# Patient Record
Sex: MALE | Race: WHITE | NOT HISPANIC OR LATINO | Employment: FULL TIME | URBAN - METROPOLITAN AREA
[De-identification: names, ages, dates, MRNs, and addresses within clinical notes are randomized per-mention and may not be internally consistent; named-entity substitution may affect disease eponyms.]

---

## 2021-08-10 ENCOUNTER — TRANSFERRED RECORDS (OUTPATIENT)
Dept: HEALTH INFORMATION MANAGEMENT | Facility: CLINIC | Age: 56
End: 2021-08-10

## 2021-10-19 ENCOUNTER — TRANSFERRED RECORDS (OUTPATIENT)
Dept: HEALTH INFORMATION MANAGEMENT | Facility: CLINIC | Age: 56
End: 2021-10-19

## 2021-10-22 ENCOUNTER — TRANSCRIBE ORDERS (OUTPATIENT)
Dept: OTHER | Age: 56
End: 2021-10-22

## 2021-10-22 ENCOUNTER — MEDICAL CORRESPONDENCE (OUTPATIENT)
Dept: HEALTH INFORMATION MANAGEMENT | Facility: CLINIC | Age: 56
End: 2021-10-22

## 2021-10-22 DIAGNOSIS — G62.9 PERIPHERAL NEUROPATHY: Primary | ICD-10-CM

## 2021-10-22 DIAGNOSIS — R52 DIFFUSE PAIN: ICD-10-CM

## 2021-10-22 DIAGNOSIS — R20.0 NUMBNESS AND TINGLING: ICD-10-CM

## 2021-10-22 DIAGNOSIS — R20.2 NUMBNESS AND TINGLING: ICD-10-CM

## 2021-10-25 NOTE — TELEPHONE ENCOUNTER
RECORDS RECEIVED FROM: External   REASON FOR VISIT: Peripheral neuropathy, Numbness and tingling, Diffuse pain   Date of Appt: 2/2/22   NOTES (FOR ALL VISITS) STATUS DETAILS   OFFICE NOTE from referring provider Received Dr Josselyn Solis @ [facility]:  8/10/21   OFFICE NOTE from other specialist N/A    DISCHARGE SUMMARY from hospital N/A    DISCHARGE REPORT from the ER N/A    OPERATIVE REPORT N/A    MEDICATION LIST Care Everywhere    IMAGING  (FOR ALL VISITS)     EMG Received [facility]:  10/19/21   EEG N/A    LUMBAR PUNCTURE N/A    KB SCAN N/A    ULTRASOUND (CAROTID BILAT) *VASCULAR* N/A    MRI (HEAD, NECK, SPINE) N/A    CT (HEAD, NECK, SPINE) N/A       Action 10/25/21 MV 8.15am   Action Taken Records request faxed to [facility]    --11/2/21 MV 2.11pm--  Recs received and scanned in chart

## 2021-10-27 ENCOUNTER — TELEPHONE (OUTPATIENT)
Dept: NEUROLOGY | Facility: CLINIC | Age: 56
End: 2021-10-27

## 2021-10-27 NOTE — TELEPHONE ENCOUNTER
[facility] Call Center    Phone Message    May a detailed message be left on voicemail: no     Reason for Call: Other: Elinor calling to request that Dr. Fernandez from [facility] receive a call to discuss if Sharath's appointment could be moved up. Please call Dr. Fernandez at [phone] to discuss.     Action Taken: Message routed to:  [facility]    Travel Screening: Not Applicable

## 2021-10-27 NOTE — TELEPHONE ENCOUNTER
Please help pt schedule new pt visit with general neurology. May be able to see sooner than with neuromuscular.     Thanks    Angelique ALBRIGHT

## 2021-11-02 NOTE — TELEPHONE ENCOUNTER
Patient is returning Angelique's call in regards to an appointment that is being offered to him for tomorrow at 8am either with Dr. Chavez or a neuromuscular provider. There is an opening with Dr. Chavez tomorrow and writer offered appointment to patient.     Please verify if appointment is scheduled appropriately.

## 2021-11-03 ENCOUNTER — OFFICE VISIT (OUTPATIENT)
Dept: NEUROLOGY | Facility: CLINIC | Age: 56
End: 2021-11-03
Payer: COMMERCIAL

## 2021-11-03 ENCOUNTER — LAB (OUTPATIENT)
Dept: LAB | Facility: CLINIC | Age: 56
End: 2021-11-03
Payer: COMMERCIAL

## 2021-11-03 VITALS — OXYGEN SATURATION: 97 % | SYSTOLIC BLOOD PRESSURE: 90 MMHG | HEART RATE: 77 BPM | DIASTOLIC BLOOD PRESSURE: 60 MMHG

## 2021-11-03 DIAGNOSIS — G90.1 DYSAUTONOMIA (H): Primary | ICD-10-CM

## 2021-11-03 DIAGNOSIS — G62.9 SENSORY NEUROPATHY: ICD-10-CM

## 2021-11-03 DIAGNOSIS — G90.1 DYSAUTONOMIA (H): ICD-10-CM

## 2021-11-03 LAB
CK SERPL-CCNC: 90 U/L (ref 30–300)
TTG IGA SER-ACNC: 0.9 U/ML
TTG IGG SER-ACNC: 0.9 U/ML

## 2021-11-03 PROCEDURE — 99205 OFFICE O/P NEW HI 60 MIN: CPT | Performed by: PSYCHIATRY & NEUROLOGY

## 2021-11-03 PROCEDURE — 84999 UNLISTED CHEMISTRY PROCEDURE: CPT | Performed by: PSYCHIATRY & NEUROLOGY

## 2021-11-03 PROCEDURE — 82525 ASSAY OF COPPER: CPT | Mod: 90 | Performed by: PATHOLOGY

## 2021-11-03 PROCEDURE — 86255 FLUORESCENT ANTIBODY SCREEN: CPT | Mod: 90 | Performed by: PATHOLOGY

## 2021-11-03 PROCEDURE — 83520 IMMUNOASSAY QUANT NOS NONAB: CPT | Performed by: PSYCHIATRY & NEUROLOGY

## 2021-11-03 PROCEDURE — 83516 IMMUNOASSAY NONANTIBODY: CPT | Performed by: PSYCHIATRY & NEUROLOGY

## 2021-11-03 PROCEDURE — 99000 SPECIMEN HANDLING OFFICE-LAB: CPT | Performed by: PATHOLOGY

## 2021-11-03 PROCEDURE — 36415 COLL VENOUS BLD VENIPUNCTURE: CPT | Performed by: PATHOLOGY

## 2021-11-03 PROCEDURE — 86334 IMMUNOFIX E-PHORESIS SERUM: CPT | Mod: 26 | Performed by: PATHOLOGY

## 2021-11-03 PROCEDURE — 86334 IMMUNOFIX E-PHORESIS SERUM: CPT | Mod: TC | Performed by: PSYCHIATRY & NEUROLOGY

## 2021-11-03 PROCEDURE — 83519 RIA NONANTIBODY: CPT | Mod: 90 | Performed by: PATHOLOGY

## 2021-11-03 PROCEDURE — 82550 ASSAY OF CK (CPK): CPT | Performed by: PATHOLOGY

## 2021-11-03 RX ORDER — GABAPENTIN 300 MG/1
600 CAPSULE ORAL 3 TIMES DAILY
COMMUNITY
Start: 2021-06-10 | End: 2021-11-22

## 2021-11-03 RX ORDER — PREGABALIN 75 MG/1
75 CAPSULE ORAL 2 TIMES DAILY
COMMUNITY
Start: 2021-07-02 | End: 2021-11-22

## 2021-11-03 RX ORDER — OMEPRAZOLE 40 MG/1
40 CAPSULE, DELAYED RELEASE ORAL DAILY
COMMUNITY
Start: 2021-06-01

## 2021-11-03 RX ORDER — CYCLOBENZAPRINE HCL 5 MG
5 TABLET ORAL 3 TIMES DAILY PRN
COMMUNITY
Start: 2021-06-29 | End: 2021-12-02

## 2021-11-03 RX ORDER — DULOXETIN HYDROCHLORIDE 60 MG/1
60 CAPSULE, DELAYED RELEASE ORAL DAILY
COMMUNITY
Start: 2021-04-30 | End: 2021-12-02

## 2021-11-03 RX ORDER — TRAZODONE HYDROCHLORIDE 50 MG/1
50-100 TABLET, FILM COATED ORAL DAILY
COMMUNITY
Start: 2021-06-29

## 2021-11-03 RX ORDER — ATORVASTATIN CALCIUM 40 MG/1
1 TABLET, FILM COATED ORAL AT BEDTIME
COMMUNITY
Start: 2021-06-01

## 2021-11-03 RX ORDER — DRONABINOL 5 MG/1
5 CAPSULE ORAL 2 TIMES DAILY
COMMUNITY
Start: 2021-05-10

## 2021-11-03 ASSESSMENT — PAIN SCALES - GENERAL: PAINLEVEL: MODERATE PAIN (4)

## 2021-11-03 NOTE — Clinical Note
Please follow up on cardiology consult as I do not see an appointment or telephone message from them. Thanks.

## 2021-11-03 NOTE — LETTER
"11/3/2021       RE: Sharath Jaimes  [address] [facility] [address]     Dear Colleague,    Thank you for referring your patient, Sharath Jaimes, to the [facility] [place] [facility]. Please see a copy of my visit note below.    Service Date: 2021    MD Perez Paulino MD        RE:       Sharath Jaimes    MRN:   [id number]    :    1965        Dear Doctors:    I saw Sharath Jaimes in neuromuscular consultation today at the [facility].  Mr. Jaimes is a 56-year-old man who sought medical attention 8 months ago because of considerable fatigue.  His blood sugar was 650 and he was hospitalized for management of newly diagnosed diabetes.  His hemoglobin A1c dropped with treatment from greater than 16.9 on 2021 to 9.6 on 2021.  During that time, and shortly after treatment began, he experienced episodic stabbing pains involving principally the torso and lower extremities.  These have improved somewhat with Lyrica 75 mg twice a day, and perhaps with time, although they do persist.  He also reports feeling lightheaded, principally when standing although not necessarily upon standing.  He has had 1 syncopal event and none recently.  He also reports pressure allodynia in the feet.  He continues to feel \"wobbly\" and says that walking slowly and \"wobbly is the new me.\" Recent electrodiagnostic studies demonstrated evidence of a polyneuropathy with conduction velocities in the demyelinating range and he was referred for further evaluation of this.    Mr. Jaimes denies cognitive or behavioral change, diplopia, ptosis or dysphagia.  He lost a considerable amount of weight prior to his current presentation, but this has now stabilized.  He does endorse some nausea and changes in bowel habits without color changes.  He endorses impotence, which developed prior to the onset of these " symptoms.  He denies changes in sweating.  He denies heat intolerance.  He feels generally weak, but does not have specific weakness, and is still working.  That said, he has had to call in sick fairly frequently because of feeling unwell.  He has no past medical history.  He smokes.  He drinks alcohol infrequently.  He has 4 children.  His mother had diabetes.  There is no family history of neuromuscular disease.    PHYSICAL EXAMINATION:  The patient is a thin gentleman.  Weight was not recorded today, however.  Blood pressure was 90/60 with a pulse of 77 and oxygen saturation of 97%.  Orthostatic vital signs were requested, but are not identifiable in the medical record at the time of this dictation; however, I was informed that there was not a substantial drop in blood pressure with standing, but that his pulse did increase by 20 points.    NEUROLOGIC EXAMINATION:  The patient was alert.  Speech, language and affect were appropriate.  He was fully oriented.  Formal testing of attention was normal.  He recalled 2/3 words after distraction, 3/3 with cues.  Pupils are equal and round and unequivocally and briskly reactive to light.  Extraocular movements were full without nystagmus or diplopia.  There was no ptosis.  Facial strength is normal.  Bulbar examination is normal.  Perception of light touch is preserved.  Vibration perception is normal at the malleoli and absent at the great toes.  Perception of pinprick is variable and does not follow a length-dependent pattern.  Motor examination demonstrates normal tone, bulk and strength.  Rapid repetitive movements are within normal limits.  Coordination is normal.  Reflexes are absent at the jaw, 1+ at bilateral brachioradialis, 0 and 2+ at right and left biceps, 2+ at bilateral triceps, 2+ at bilateral patellae, and absent at the ankles.  Plantar responses are mute.  He walks independently with a normal base and posture.  Romberg sign is not present.    MEDICAL  RECORD REVIEW:  Electrodiagnostic studies are as noted.  Normal or negative laboratory studies include vitamin B12 level, HIV titer, syphilis serology, TSH, and basic metabolic panel except for elevated glucose and mildly elevated BUN and creatinine ratio as of June.    Mr. Jaimes has widespread painful paresthesias that may reflect treatment induced neuropathy of diabetes; however, electrodiagnostic studies demonstrate findings fulfilling EFNS/PNS criteria for CIDP.  His clinical presentation; however, is not characteristic of CIDP, in as much as strength is preserved, large fiber sensation is relatively preserved, and reflexes are relatively preserved.  In addition, he has signs suggestive of autonomic dysfunction, although apparently diane an appropriate tachycardia upon standing.    I have extended his laboratory studies considerably to investigate the aforementioned.  I will arrange genetic testing for an inherited neuropathy panel.  We will check his CMAPs after 10 seconds of maximal voluntary contraction, as generalized fatigue and autonomic dysfunction, in addition combined with low amplitudes on initial testing, can occur in Lambert-Eaton myasthenic syndrome, although this of course would not explain his conduction velocities.  His inherited neuropathy panel will include testing for TTR mutations in consideration of the autonomic dysfunction, painful neuropathy, and abnormal conduction velocities.  CSF examination may be necessary as well, depending upon results of the aforementioned.    With regard to management, I increased his Lyrica modestly and we can increase it further if necessary.  He was prescribed duloxetine at one time, but did not take it and does not recall why.  I discussed first line management of orthostatic hypotension, although to be clear he may have more of a postural orthostatic tachycardia then true orthostatic hypotension.  Nonetheless, the relatively low baseline blood pressure is  notable.  He may benefit from the addition of Florinef or midodrine, and I am referring him to our Cardiology service for further evaluation and recommendations; they will likely do cardiovagal and tilt table testing.  Because of a prior abnormal upper endoscopy and likely gastroparesis, I have referred him for gastroenterology evaluation as well.  His condition is clearly active and I will make arrangements to see him back in the relatively near future.    Sincerely,     Rocky Chavez MD      60 minutes spent on the date of the encounter on chart review, history and examination, documentation and further activities as noted above.

## 2021-11-03 NOTE — PATIENT INSTRUCTIONS
1. Blood tests today.  2. Referral to gastroenterology for help with symptoms of gastroparesis and followup of endoscopy findings.  3. Referral to cardiology for help with low blood pressure as a symptom of autonomic dysfunction. They may start medication.  4. Genetic testing. You will get a call to arrange.  5. Increase Lyrica to 100 mg twice daily.  6. Follow up with me in several weeks. We may do it in the EMG lab as I would like to check one brief test of the nerves.

## 2021-11-04 LAB — PROT PATTERN SERPL IFE-IMP: NORMAL

## 2021-11-04 NOTE — PROGRESS NOTES
"Service Date: 2021    MD Perez Paulino MD        RE:       Sharath Jaimes    MRN:   [id number]    :    1965        Dear Doctors:    I saw Sharath Jaimes in neuromuscular consultation today at the [facility].  Mr. Jaimes is a 56-year-old man who sought medical attention 8 months ago because of considerable fatigue.  His blood sugar was 650 and he was hospitalized for management of newly diagnosed diabetes.  His hemoglobin A1c dropped with treatment from greater than 16.9 on 2021 to 9.6 on 2021.  During that time, and shortly after treatment began, he experienced episodic stabbing pains involving principally the torso and lower extremities.  These have improved somewhat with Lyrica 75 mg twice a day, and perhaps with time, although they do persist.  He also reports feeling lightheaded, principally when standing although not necessarily upon standing.  He has had 1 syncopal event and none recently.  He also reports pressure allodynia in the feet.  He continues to feel \"wobbly\" and says that walking slowly and \"wobbly is the new me.\" Recent electrodiagnostic studies demonstrated evidence of a polyneuropathy with conduction velocities in the demyelinating range and he was referred for further evaluation of this.    Mr. Jaimes denies cognitive or behavioral change, diplopia, ptosis or dysphagia.  He lost a considerable amount of weight prior to his current presentation, but this has now stabilized.  He does endorse some nausea and changes in bowel habits without color changes.  He endorses impotence, which developed prior to the onset of these symptoms.  He denies changes in sweating.  He denies heat intolerance.  He feels generally weak, but does not have specific weakness, and is still working.  That said, he has had to call in sick fairly frequently because of feeling unwell.  He has no past medical history.  He smokes.  He drinks alcohol " infrequently.  He has 4 children.  His mother had diabetes.  There is no family history of neuromuscular disease.    PHYSICAL EXAMINATION:  The patient is a thin gentleman.  Weight was not recorded today, however.  Blood pressure was 90/60 with a pulse of 77 and oxygen saturation of 97%.  Orthostatic vital signs were requested, but are not identifiable in the medical record at the time of this dictation; however, I was informed that there was not a substantial drop in blood pressure with standing, but that his pulse did increase by 20 points.    NEUROLOGIC EXAMINATION:  The patient was alert.  Speech, language and affect were appropriate.  He was fully oriented.  Formal testing of attention was normal.  He recalled 2/3 words after distraction, 3/3 with cues.  Pupils are equal and round and unequivocally and briskly reactive to light.  Extraocular movements were full without nystagmus or diplopia.  There was no ptosis.  Facial strength is normal.  Bulbar examination is normal.  Perception of light touch is preserved.  Vibration perception is normal at the malleoli and absent at the great toes.  Perception of pinprick is variable and does not follow a length-dependent pattern.  Motor examination demonstrates normal tone, bulk and strength.  Rapid repetitive movements are within normal limits.  Coordination is normal.  Reflexes are absent at the jaw, 1+ at bilateral brachioradialis, 0 and 2+ at right and left biceps, 2+ at bilateral triceps, 2+ at bilateral patellae, and absent at the ankles.  Plantar responses are mute.  He walks independently with a normal base and posture.  Romberg sign is not present.    MEDICAL RECORD REVIEW:  Electrodiagnostic studies are as noted.  Normal or negative laboratory studies include vitamin B12 level, HIV titer, syphilis serology, TSH, and basic metabolic panel except for elevated glucose and mildly elevated BUN and creatinine ratio as of June.    Mr. Jaimes has widespread painful  paresthesias that may reflect treatment induced neuropathy of diabetes; however, electrodiagnostic studies demonstrate findings fulfilling EFNS/PNS criteria for CIDP.  His clinical presentation; however, is not characteristic of CIDP, in as much as strength is preserved, large fiber sensation is relatively preserved, and reflexes are relatively preserved.  In addition, he has signs suggestive of autonomic dysfunction, although apparently diane an appropriate tachycardia upon standing.    I have extended his laboratory studies considerably to investigate the aforementioned.  I will arrange genetic testing for an inherited neuropathy panel.  We will check his CMAPs after 10 seconds of maximal voluntary contraction, as generalized fatigue and autonomic dysfunction, in addition combined with low amplitudes on initial testing, can occur in Lambert-Eaton myasthenic syndrome, although this of course would not explain his conduction velocities.  His inherited neuropathy panel will include testing for TTR mutations in consideration of the autonomic dysfunction, painful neuropathy, and abnormal conduction velocities.  CSF examination may be necessary as well, depending upon results of the aforementioned.    With regard to management, I increased his Lyrica modestly and we can increase it further if necessary.  He was prescribed duloxetine at one time, but did not take it and does not recall why.  I discussed first line management of orthostatic hypotension, although to be clear he may have more of a postural orthostatic tachycardia then true orthostatic hypotension.  Nonetheless, the relatively low baseline blood pressure is notable.  He may benefit from the addition of Florinef or midodrine, and I am referring him to our Cardiology service for further evaluation and recommendations; they will likely do cardiovagal and tilt table testing.  Because of a prior abnormal upper endoscopy and likely gastroparesis, I have referred  him for gastroenterology evaluation as well.  His condition is clearly active and I will make arrangements to see him back in the relatively near future.    Sincerely,     Rocky Chavez MD        D: 2021   T: 2021   MT: cesar    Name:     JESICA MERCADO  MRN:      [id number]        Account:      [id number]   :      1965           Service Date: 2021       Document: [id number]    60 minutes spent on the date of the encounter on chart review, history and examination, documentation and further activities as noted above.

## 2021-11-05 ENCOUNTER — TELEPHONE (OUTPATIENT)
Dept: GASTROENTEROLOGY | Facility: CLINIC | Age: 56
End: 2021-11-05
Payer: COMMERCIAL

## 2021-11-05 LAB — ACHR BIND AB SER-SCNC: 0 NMOL/L

## 2021-11-05 NOTE — TELEPHONE ENCOUNTER
[facility] Call Center    Phone Message    May a detailed message be left on voicemail: yes     Reason for Call: Other: Patient is being referred for abnormal EGD results that were completed through [facility] and management of gastroparesis. Patient has lost 60lbs within 1 year. Please review per scheduling guidelines. Thanks!      Action Taken: Message routed to:  [facility] GI    Travel Screening: Not Applicable

## 2021-11-06 LAB — COPPER SERPL-MCNC: 86.9 UG/DL

## 2021-11-09 LAB
AMPHIPHYSIN AB TITR SER: NEGATIVE TITER
CV2 IGG TITR SER: NEGATIVE TITER
GLIAL NUC TYPE 1 AB TITR SER: NEGATIVE TITER
HU1 AB TITR SER: NEGATIVE TITER
HU2 AB TITR SER IF: NEGATIVE TITER
HU3 AB TITR SER: NEGATIVE TITER
IMMUNOLOGIST REVIEW: NORMAL
LABORATORY COMMENT REPORT: NORMAL
NACHR AB SER-SCNC: 0 NMOL/L
PCA-1 AB TITR SER: NEGATIVE TITER
PCA-2 AB TITR SER: NEGATIVE TITER
PCA-TR AB TITR SER IF: NEGATIVE TITER
VGCC-P/Q BIND AB SER-SCNC: 0 NMOL/L
VGKC AB SER-SCNC: 0 NMOL/L

## 2021-11-12 NOTE — TELEPHONE ENCOUNTER
REFERRAL INFORMATION:    Referring Provider: Dr. Rocky Chavez     Referring Clinic:  [facility]     Reason for Visit/Diagnosis: Dysautonomia, Sensory neuropathy      FUTURE VISIT INFORMATION:    Appointment Date: 2/22/2022    Appointment Time: 10:20 AM      NOTES STATUS DETAILS   OFFICE NOTE from Referring Provider Internal 11/3/2021 Office visit with Dr. Chavez     OFFICE NOTE from Other Specialist N/A    HOSPITAL DISCHARGE SUMMARY/  ED VISITS N/A    OPERATIVE REPORT N/A    MEDICATION LIST Internal         ENDOSCOPY  Care Everywhere EGD: 1/11/2021 ([facility])    COLONOSCOPY Care Everywhere 7/9/2021 ([facility])    ERCP N/A    EUS N/A    STOOL TESTING N/A    PERTINENT LABS Care Everywhere    PATHOLOGY REPORTS (RELATED) Care Everywhere 7/9/2021, 1/11/2021   IMAGING (CT, MRI, EGD, MRCP, Small Bowel Follow Through/SBT, MR/CT Enterography) N/A

## 2021-11-17 ENCOUNTER — OFFICE VISIT (OUTPATIENT)
Dept: NEUROLOGY | Facility: CLINIC | Age: 56
End: 2021-11-17
Payer: COMMERCIAL

## 2021-11-17 DIAGNOSIS — G62.9 SENSORY NEUROPATHY: Primary | ICD-10-CM

## 2021-11-17 DIAGNOSIS — G90.1 DYSAUTONOMIA (H): ICD-10-CM

## 2021-11-17 PROCEDURE — 95907 NVR CNDJ TST 1-2 STUDIES: CPT | Performed by: PSYCHIATRY & NEUROLOGY

## 2021-11-17 NOTE — LETTER
2021       RE: Sharath Jaimes  [address] [place] [address]     Dear Colleague,    Thank you for referring your patient, Sharath Jaimes, to the [facility]. Please see a copy of my visit note below.                        [facility]  Electrodiagnostic Laboratory                 Department of Neurology                                                                                                         Test Date:  2021    Patient: Sharath Jaimes : 1965 Physician: Rocky Chavez MD   Sex: Male AGE: 56 year Ref Phys:    ID#: [id number]   Technician: Wong Blancas     Clinical Information:  Sharath Jaimes is a 56 year old man with autonomic symptoms and fatigue. He is referred for assessment of change in compound muscle action potential (CMAP) amplitude after exercise.    Techniques:  Motor conduction studies were done with surface recording electrodes.     Results:  A right median motor conduction study demonstrated moderately severe prolongation of distal latency, moderately severe attenuation of CMAP amplitude, and mild slowing of conduction. There was no increment in CMAP amplitude after exercise. A right tibial motor conduction study demonstrated moderately severe attenuation of CMAP amplitude and mild slowing of conduction.     Interpretation:  This is an abnormal study, demonstrating electrophysiologic evidence of the followin. No increment in median CMAP amplitude after exercise.  2. Re-demonstration of CMAP attenuation and prolongation of median distal motor latency. All values are mildly or moderately improved compared with the prior study of 2021.      ___________________________  Rocky Chavez MD        Nerve Conduction Studies  Motor Sites      Latency Amplitude Neg. Amp Diff Segment Distance Velocity Neg. Dur Neg Area Diff Temperature Comment   Site (ms) Norm (mV) Norm %  cm  m/s Norm ms %  C    Right Median (APB) Motor   Wrist *8.0  < 4.2 *2.3  > 5.0  Wrist-APB 8   7.2  31.8    Elbow 13.6 - 2.2 - -4.3 Elbow-Wrist 23 *41  > 48 7.4 -3.8 32.1    Wrist (post-ex) 7.9 - 2.5 - 13.6 Axilla-Elbow - - - 7.6 21.3 32.5 I0 Sec ELDA test   Right Tibial (AHB) Motor   Ankle 4.5  < 6.5 *1.51  > 4.4  Ankle-AHB 8   -  29.7    Knee 18.0 - 0.66 - -56.3 Knee-Ankle 47 *35  > 38 9.0 - 30.7            NCS Waveforms:    Motor         Again, thank you for allowing me to participate in the care of your patient.      Sincerely,    Rocky Chavez MD

## 2021-11-17 NOTE — PROGRESS NOTES
[facility]  Electrodiagnostic Laboratory                 Department of Neurology                                                                                                         Test Date:  2021    Patient: Sharath Jaimes : 1965 Physician: Rocky Chavez MD   Sex: Male AGE: 56 year Ref Phys:    ID#: [id number]   Technician: Wong Blancas     Clinical Information:  Sharath Jaimes is a 56 year old man with autonomic symptoms and fatigue. He is referred for assessment of change in compound muscle action potential (CMAP) amplitude after exercise.    Techniques:  Motor conduction studies were done with surface recording electrodes.     Results:  A right median motor conduction study demonstrated moderately severe prolongation of distal latency, moderately severe attenuation of CMAP amplitude, and mild slowing of conduction. There was no increment in CMAP amplitude after exercise. A right tibial motor conduction study demonstrated moderately severe attenuation of CMAP amplitude and mild slowing of conduction.     Interpretation:  This is an abnormal study, demonstrating electrophysiologic evidence of the followin. No increment in median CMAP amplitude after exercise.  2. Re-demonstration of CMAP attenuation and prolongation of median distal motor latency. All values are mildly or moderately improved compared with the prior study of 2021.      ___________________________  Rocky Chavez MD        Nerve Conduction Studies  Motor Sites      Latency Amplitude Neg. Amp Diff Segment Distance Velocity Neg. Dur Neg Area Diff Temperature Comment   Site (ms) Norm (mV) Norm %  cm m/s Norm ms %  C    Right Median (APB) Motor   Wrist *8.0  < 4.2 *2.3  > 5.0  Wrist-APB 8   7.2  31.8    Elbow 13.6 - 2.2 - -4.3 Elbow-Wrist 23 *41  > 48 7.4 -3.8 32.1    Wrist (post-ex) 7.9 - 2.5 - 13.6 Axilla-Elbow - - - 7.6 21.3 32.5 I0 Sec ELDA test   Right Tibial (AHB) Motor   Ankle 4.5  < 6.5  *1.51  > 4.4  Ankle-AHB 8   -  29.7    Knee 18.0 - 0.66 - -56.3 Knee-Ankle 47 *35  > 38 9.0 - 30.7            NCS Waveforms:    Motor

## 2021-11-24 ENCOUNTER — TELEPHONE (OUTPATIENT)
Dept: CARDIOLOGY | Facility: CLINIC | Age: 56
End: 2021-11-24
Payer: COMMERCIAL

## 2021-11-24 LAB — SCANNED LAB RESULT: NORMAL

## 2021-12-01 ENCOUNTER — TELEPHONE (OUTPATIENT)
Dept: CARDIOLOGY | Facility: CLINIC | Age: 56
End: 2021-12-01
Payer: COMMERCIAL

## 2021-12-01 NOTE — TELEPHONE ENCOUNTER
Spoke with patient, but was busy at work said he will call when he is not busy. Patient is to be scheduled with an EP cardiologist. Dr. koo seems to have the earliest available. Book as a New arrhythmia patient.

## 2021-12-02 ENCOUNTER — OFFICE VISIT (OUTPATIENT)
Dept: NEUROLOGY | Facility: CLINIC | Age: 56
End: 2021-12-02
Payer: COMMERCIAL

## 2021-12-02 ENCOUNTER — TELEPHONE (OUTPATIENT)
Dept: CARDIOLOGY | Facility: CLINIC | Age: 56
End: 2021-12-02

## 2021-12-02 VITALS
RESPIRATION RATE: 16 BRPM | HEART RATE: 75 BPM | SYSTOLIC BLOOD PRESSURE: 114 MMHG | OXYGEN SATURATION: 97 % | DIASTOLIC BLOOD PRESSURE: 67 MMHG

## 2021-12-02 DIAGNOSIS — G90.1 DYSAUTONOMIA (H): Primary | ICD-10-CM

## 2021-12-02 PROBLEM — E11.9 TYPE 2 DIABETES MELLITUS (H): Status: ACTIVE | Noted: 2021-01-09

## 2021-12-02 PROBLEM — G60.8 PERIPHERAL SENSORY NEUROPATHY: Status: ACTIVE | Noted: 2021-01-08

## 2021-12-02 PROBLEM — R91.1 LUNG NODULE: Status: ACTIVE | Noted: 2021-05-11

## 2021-12-02 PROBLEM — K22.70 BARRETT'S ESOPHAGUS WITHOUT DYSPLASIA: Status: ACTIVE | Noted: 2021-01-12

## 2021-12-02 PROBLEM — E78.5 HYPERLIPIDEMIA: Status: ACTIVE | Noted: 2021-01-11

## 2021-12-02 PROCEDURE — 99215 OFFICE O/P EST HI 40 MIN: CPT | Performed by: PSYCHIATRY & NEUROLOGY

## 2021-12-02 RX ORDER — BLOOD-GLUCOSE METER
EACH MISCELLANEOUS
COMMUNITY
Start: 2021-01-15

## 2021-12-02 RX ORDER — PEN NEEDLE, DIABETIC 32GX 5/32"
NEEDLE, DISPOSABLE MISCELLANEOUS
COMMUNITY
Start: 2021-04-07

## 2021-12-02 RX ORDER — LANCETS
EACH MISCELLANEOUS
COMMUNITY
Start: 2021-03-31

## 2021-12-02 RX ORDER — FLASH GLUCOSE SCANNING READER
EACH MISCELLANEOUS SEE ADMIN INSTRUCTIONS
COMMUNITY
Start: 2021-01-30

## 2021-12-02 ASSESSMENT — PAIN SCALES - GENERAL: PAINLEVEL: MODERATE PAIN (5)

## 2021-12-02 NOTE — Clinical Note
Denise or scheduling team - as we discussed, new patient appointment with Dr Monique should be cancelled. Thanks.

## 2021-12-02 NOTE — PATIENT INSTRUCTIONS
"1. Follow up with cardiology as scheduled, as well as the genetic counselor visit.  2. I have ordered \"tilt table testing\" which is a safe and painless test.  3. Follow up with Dr Price regarding your diabetes and general health.   4. I will review whether we should do more testing in endocrinology.  5. Continue Lyrica.  6. Maintain good hydration.     "

## 2021-12-02 NOTE — PROGRESS NOTES
[id number]      Mental state: Alert, appropriate, speech, language, and thought content normal.     Cranial nerves II-XII normal.    Sensory examination:     Right Left   Light touch Normal Normal   Vibration (timed) 6 4   Vibration (Rydell-Seiffer)     Temp     Pin K Normal   Pos     Legend:   MM = medial malleolus, TT = tibial tuberosity, K = patella, MCP = MCP joint  MF = mid-foot, DC = distal calf, MC = mid calf, PC = proximal calf      Motor examination:     Right Left   Shoulder abduction  5 5   Elbow extension 5 5   Elbow flexion 5 5   Wrist extension  5 5   Finger extension 5 5   FDI 5 5   APB 5 5   Hip flexion 5 5   Knee flexion 5 5   Knee extension 5 5   Dorsiflexion 5 5   Plantar flexion 5 5   A=atrophy    Tone normal     Reflexes:   Right Left   Biceps 2 2   BRD 2 2   Triceps 2 2   Leslye 2 2   Patellar 2 2   Achilles 0 0   Plantar Flexor Flexor   Clonus Absent Absent      Gait:  Antalgic.      Impression:      Recommendations:        Rocky Cahvez M.D.      *** minutes spent on the date of the encounter on chart review, history and examination, documentation and further activities as noted above.

## 2021-12-02 NOTE — LETTER
2021       RE: Sharath Jaimes  [address]  [facility] [address]     Dear Colleague,    Thank you for referring your patient, Sharath Jaimes, to the [facility] [place] at [facility]. Please see a copy of my visit note below.    Service Date: 2021    Perez Price MD        RE:      Sharath Jaimes     MRN:  [id number]    :   1965        Dear Dr. Price:      I saw Sharath Jaimes in followup at the [facility], where I have seen him previously for a painful sensory and autonomic neuropathy.  Laboratory studies performed to date to principally to identify dysimmune etiologies of symptoms have been negative.  Limited nerve conduction studies did not demonstrate an increment in compound muscle action potential amplitude after voluntary contraction.  Notably, there was a modest improvement in his strikingly abnormal nerve conduction studies as well compared with his initial nerve conduction studies, although this was a very limited study.  As noted previously, he may have a median neuropathy at the wrist as well, although this is certainly not his principal clinical problem.    Examination today is as follows:      Mental state: Alert, appropriate, speech, language, and thought content normal.     Cranial nerves II-XII normal.    Sensory examination:     Right Left   Light touch Normal Normal   Vibration (timed) 6 4   Vibration (Rydell-Seiffer)     Temp     Pin K Normal   Pos     Legend:   MM = medial malleolus, TT = tibial tuberosity, K = patella, MCP = MCP joint  MF = mid-foot, DC = distal calf, MC = mid calf, PC = proximal calf      Motor examination:     Right Left   Shoulder abduction  5 5   Elbow extension 5 5   Elbow flexion 5 5   Wrist extension  5 5   Finger extension 5 5   FDI 5 5   APB 5 5   Hip flexion 5 5   Knee flexion 5 5   Knee extension 5 5   Dorsiflexion 5 5   Plantar flexion 5 5    A=atrophy    Tone normal     Reflexes:   Right Left   Biceps 2 2   BRD 2 2   Triceps 2 2   Leslye 2 2   Patellar 2 2   Achilles 0 0   Plantar Flexor Flexor   Clonus Absent Absent       With regard to symptoms, Mr. Jaimes reports that there is a significant improvement in his pain and lightheadedness.  He is maintaining his weight.  His blood sugars are about 150-200.  He has had no syncopal episodes since he was seen last, and he is quite careful when walking.    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  Pregabalin may be helping as well.  We discussed this and he chooses not to increase his dose further, which I think is reasonable.  We have arranged a tilt table study and an evaluation in our Cardiology division.  His blood pressure today was higher than previously, at 114 systolic, and I do not see a compelling indication for pharmacologic treatment of hypotension, although as noted previously it would be prudent for him to sleep with his head elevated and maintain good hydration.  Dr. Barclay in our Cardiology division will make further recommendations.  He was not significantly orthostatic when tested previously here, and had only a 15-beat increase in pulse upon standing, although there was a greater increase after 60 seconds standing, supportive of possible POTS.  In addition, Mr. Jaimes reports feeling paradoxically tired after caffeine drinks, although it is not clear whether this is a causal relationship.      Finally, it remains the case that he has strikingly abnormal nerve conduction studies; however, in this clinical context I again feel further evaluation for an acquired demyelinating polyneuropathy is of limited yield. We will proceed with genetic testing as discussed previously and I will monitor his condition closely.    I have asked Mr. Jaimes to follow up with me in 6  weeks.  I have asked that he follow up with you as well in the relatively near future for his general medical health.  Consideration could be given to a cosyntropin test too given the degree of fatigue he is experiencing.  I will defer to Dr. Barclay or you regarding further evaluation for his generalized fatigue as this could be related either to diabetes or to POTS.      Sincerely,         Rocky Chavez MD    40 minutes spent on the date of the encounter on chart review, history and examination, documentation and further activities as noted above.

## 2021-12-02 NOTE — TELEPHONE ENCOUNTER
Called pt to notify him that we will have to switch his appt to in person. Dr. Barclay does not believe a telephone visit will be beneficial as she would like an EKG, vitals and physical exam done on pt.     Pt did not answer but left VM stating we need to either switch appt today to in person or we can find a different date that will work for him to come into clinic for appt.     Vijay Peterson RN   Cardiology Nurse Coordinator

## 2021-12-03 NOTE — PROGRESS NOTES
Service Date: 2021    Perez Price MD        RE:      Sharath Jaimes     MRN:  [id number]    :   1965        Dear Dr. Price:      I saw Sharath Jaimes in followup at the [facility], where I have seen him previously for a painful sensory and autonomic neuropathy.  Laboratory studies performed to date to principally to identify dysimmune etiologies of symptoms have been negative.  Limited nerve conduction studies did not demonstrate an increment in compound muscle action potential amplitude after voluntary contraction.  Notably, there was a modest improvement in his strikingly abnormal nerve conduction studies as well compared with his initial nerve conduction studies, although this was a very limited study.  As noted previously, he may have a median neuropathy at the wrist as well, although this is certainly not his principal clinical problem.    Examination today is as follows:      Mental state: Alert, appropriate, speech, language, and thought content normal.     Cranial nerves II-XII normal.    Sensory examination:     Right Left   Light touch Normal Normal   Vibration (timed) 6 4   Vibration (Rydell-Seiffer)     Temp     Pin K Normal   Pos     Legend:   MM = medial malleolus, TT = tibial tuberosity, K = patella, MCP = MCP joint  MF = mid-foot, DC = distal calf, MC = mid calf, PC = proximal calf      Motor examination:     Right Left   Shoulder abduction  5 5   Elbow extension 5 5   Elbow flexion 5 5   Wrist extension  5 5   Finger extension 5 5   FDI 5 5   APB 5 5   Hip flexion 5 5   Knee flexion 5 5   Knee extension 5 5   Dorsiflexion 5 5   Plantar flexion 5 5   A=atrophy    Tone normal     Reflexes:   Right Left   Biceps 2 2   BRD 2 2   Triceps 2 2   Leslye 2 2   Patellar 2 2   Achilles 0 0   Plantar Flexor Flexor   Clonus Absent Absent       With regard to symptoms, Mr. Jaimes reports that there is a significant improvement in his pain and lightheadedness.  He  is maintaining his weight.  His blood sugars are about 150-200.  He has had no syncopal episodes since he was seen last, and he is quite careful when walking.    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  Pregabalin may be helping as well.  We discussed this and he chooses not to increase his dose further, which I think is reasonable.  We have arranged a tilt table study and an evaluation in our Cardiology division.  His blood pressure today was higher than previously, at 114 systolic, and I do not see a compelling indication for pharmacologic treatment of hypotension, although as noted previously it would be prudent for him to sleep with his head elevated and maintain good hydration.  Dr. Barclay in our Cardiology division will make further recommendations.  He was not significantly orthostatic when tested previously here, and had only a 15-beat increase in pulse upon standing, although there was a greater increase after 60 seconds standing, supportive of possible POTS.  In addition, Mr. Jaimes reports feeling paradoxically tired after caffeine drinks, although it is not clear whether this is a causal relationship.      Finally, it remains the case that he has strikingly abnormal nerve conduction studies; however, in this clinical context I again feel further evaluation for an acquired demyelinating polyneuropathy is of limited yield. We will proceed with genetic testing as discussed previously and I will monitor his condition closely.    I have asked Mr. Jaimes to follow up with me in 6 weeks.  I have asked that he follow up with you as well in the relatively near future for his general medical health.  Consideration could be given to a cosyntropin test too given the degree of fatigue he is experiencing.  I will defer to Dr. Barclay or you regarding further evaluation for his generalized fatigue  as this could be related either to diabetes or to POTS.      Sincerely,         Rocky Chavez MD    40 minutes spent on the date of the encounter on chart review, history and examination, documentation and further activities as noted above.    D: 2021   T: 2021   MT: cesar    Name:     JESICA MERCADO  MRN:      [id number]        Account:      [id number]   :      1965           Service Date: 2021       Document: [id number]

## 2021-12-03 NOTE — TELEPHONE ENCOUNTER
RECORDS RECEIVED FROM:    DATE RECEIVED:    NOTES STATUS DETAILS   OFFICE NOTE from referring provider    Internal Dr. Chavez 12-2-21   OFFICE NOTE from other cardiologists   and neurologists Internal Dr. Chavez 12-2-21   DISCHARGE SUMMARY from hospital    Care Everywhere 1-9-21 [facility]   DISCHARGE REPORT from the ER   N/A    OPERATIVE REPORT    N/A    MEDICATION LIST   Internal    LABS     BMP   Care Everywhere 6-8-21   CBC   N/A    CMP   N/A    Lipids   Care Everywhere 1-11-21   TSH   Care Everywhere 6-8-21   DIAGNOSTIC PROCEDURES     EKG  Strips *important N/A    Monitor Reports  Strips *important N/A    Cardioversions   N/A    ICD/pacemaker implant       Tilt table studies   N/A Order placed by Dr. Chavez   IMAGING (DISC & REPORT)      ECHO's   N/A    Stress Tests   N/A    Cath   N/A    CT/CTA   N/A    MRI/MRA   In process 5-4-21 Requested from [facility]     Action 12/3/21 [facility]   Action Taken Requested CT Chest 5-4-21 from [facility]    Resolved CT Chest in PACS 12/9

## 2021-12-08 ENCOUNTER — VIRTUAL VISIT (OUTPATIENT)
Dept: NEUROLOGY | Facility: CLINIC | Age: 56
End: 2021-12-08
Payer: COMMERCIAL

## 2021-12-08 DIAGNOSIS — G90.1 DYSAUTONOMIA (H): Primary | ICD-10-CM

## 2021-12-08 DIAGNOSIS — G62.9 SENSORY NEUROPATHY: ICD-10-CM

## 2021-12-08 PROCEDURE — 99207 PR NO BILLABLE SERVICE THIS VISIT: CPT | Mod: TEL | Performed by: GENETIC COUNSELOR, MS

## 2021-12-08 PROCEDURE — 99207 PR NO CHARGE LOS: CPT | Performed by: GENETIC COUNSELOR, MS

## 2021-12-08 NOTE — PROGRESS NOTES
Sharath is a 56 year old who is being evaluated via a billable telephone visit.      What phone number would you like to be contacted at? [phone]    How would you like to obtain your AVS? Mail a copy    Anali Riddle

## 2021-12-08 NOTE — PROGRESS NOTES
Sharath Jaimes was seen for a genetic counseling appointment at the request of Dr. Chavez today given his history of demyelinating neuropathy and autonomic dysfunction.    Pertinent Medical History: See Dr Chavez's note.    Family History: A three generation pedigree was obtained today and scanned into the EMR. This family history is by patient report only and has not been verified with medical records except where noted. The following information is significant:     Sharath has 1 son and 3 daughters.  Sharath son (age 27) has a history of esophageal and stomach problems at birth but is otherwise healthy.  Sharath's daughters (age 26, 24 and 22) are all alive and well.    Sharath has 2 maternal half brothers who are in their 40s and are alive and well.  His maternal half-brother who is in his early 40s has 1 son (age 7) who is alive and well.    Sharath does not have information on his father, his father's health history, or the paternal side of his family.  Paternal ancestry is Citizen of [place].    Sharath's mother (age 72) has a history of diabetes but is otherwise healthy.  Limited information is available regarding her brother and his children.  Sharath's maternal grandmother is  and Sharath's maternal grandfather is  and had a history of diabetes.  Maternal ancestry is [place], [place]ian, Bur[place]an, [place]ian and [place]inese.    Family history is negative for neuropathy, dementia, Parkinson's disease and muscle weakness.  Consanguinity is denied.    Discussion: We reviewed possible causes of neuropathy as well as options for genetic testing.     Neuropathy is a common neurologic disease affecting 3 million individuals every year in the [place]. Neuropathy may be caused by a particular single gene change (mutation), may be caused by environmental factors such as chemical exposure and certain types of medication or may be multifactorial meaning they are due to a combination of environmental and genetic factors.  Neuropathy may present  as an isolated finding (non-syndromic) or as a part of a broader phenotype (syndromic). There are several neurologic and neuromuscular disorders that include neuropathy as a prominent feature. Inherited neuropathies can be inherited in an autosomal dominant inheritance pattern, meaning only one genetic mutation in a gene causes disease, an autosomal recessive inheritance pattern, meaning two mutations in a gene cause disease, or an X-linked inheritance pattern, meaning a mutation in a gene on the X-chromosome causes disease.     Our genes are sequences of letters that provide instructions that help our body grow, develop and function. Sometimes a change occurs in a gene that causes our body to be unable to read these instructions. This can result in a genetic condition. We know that there are many different types of genetic changes that are associated with neuropathy. These genetic changes cause a variety of different genetic conditions. Due to significant overlap in the clinical features of these conditions, it would be irresponsible to test for only one of these disorders. For this reason, a panel of genes related to syndromic and non-syndromic hereditary neuropathies is recommended. This test looks at many genes related to neuropathies to determine if any variants or changes are present.    There are three possible results for this testing:    o Positive: A positive result indicates that a genetic variant has been identified that explains the cause of Sharath s symptoms. A positive result may provide information on prognosis and other symptoms related to the genetic change. It may also help guide medical management for Sharath and may provide information to other family members regarding their risk.   o Negative: A negative result indicates that a disease causing genetic variant was not identified  o Variant of uncertain significance (VUS): A VUS is an uncertain result that indicates a genetic change was identified,  but it is currently unknown if that change is associated with a genetic disorder.    Identifying a possible underlying genetic etiology of an individual's neuropathy can help confirm a diagnosis of a specific genetic syndrome or type of neuropathy, provide information about prognosis, and provide additional information for familial recurrence risk and family planning.      Genetic testing options were discussed. A comprehensive neuropathies panel is available through [facility] [facility] sponsored program or through insurance.  Risks benefits and limitations of these tests were reviewed. Sharath expressed an excellent understanding of this information and decided to pursue this testing through the [employer] sponsored program.    Plan:  1.  [employer] sponsored comprehensive neuropathy panel  2. Return pending results of above testing  3. Contact information was provided should any questions arise in the future.     Keke Finch MS formerly [facility]  Genetic Counselor  Division of Genetics and Metabolism  (p) [phone]  (f) [phone]     Total time spent in consultation with the family was approximately 23 minutes    Cc: No Letter

## 2021-12-16 ENCOUNTER — LAB (OUTPATIENT)
Dept: LAB | Facility: CLINIC | Age: 56
End: 2021-12-16
Payer: COMMERCIAL

## 2021-12-16 ENCOUNTER — PRE VISIT (OUTPATIENT)
Dept: CARDIOLOGY | Facility: CLINIC | Age: 56
End: 2021-12-16
Payer: COMMERCIAL

## 2021-12-16 ENCOUNTER — OFFICE VISIT (OUTPATIENT)
Dept: CARDIOLOGY | Facility: CLINIC | Age: 56
End: 2021-12-16
Attending: PSYCHIATRY & NEUROLOGY
Payer: COMMERCIAL

## 2021-12-16 VITALS
OXYGEN SATURATION: 100 % | SYSTOLIC BLOOD PRESSURE: 101 MMHG | DIASTOLIC BLOOD PRESSURE: 69 MMHG | HEIGHT: 71 IN | WEIGHT: 157.7 LBS | BODY MASS INDEX: 22.08 KG/M2 | HEART RATE: 107 BPM

## 2021-12-16 DIAGNOSIS — R42 POSTURAL DIZZINESS WITH PRESYNCOPE: ICD-10-CM

## 2021-12-16 DIAGNOSIS — G62.9 SENSORY NEUROPATHY: ICD-10-CM

## 2021-12-16 DIAGNOSIS — G90.1 DYSAUTONOMIA (H): ICD-10-CM

## 2021-12-16 DIAGNOSIS — I95.1 ORTHOSTATIC HYPOTENSION: ICD-10-CM

## 2021-12-16 DIAGNOSIS — R55 POSTURAL DIZZINESS WITH PRESYNCOPE: ICD-10-CM

## 2021-12-16 DIAGNOSIS — Z79.4 TYPE 2 DIABETES MELLITUS WITH HYPEROSMOLARITY WITHOUT COMA, WITH LONG-TERM CURRENT USE OF INSULIN (H): Primary | ICD-10-CM

## 2021-12-16 DIAGNOSIS — E11.00 TYPE 2 DIABETES MELLITUS WITH HYPEROSMOLARITY WITHOUT COMA, WITH LONG-TERM CURRENT USE OF INSULIN (H): Primary | ICD-10-CM

## 2021-12-16 LAB — CORTIS SERPL-MCNC: 28.2 UG/DL (ref 4–22)

## 2021-12-16 PROCEDURE — 36415 COLL VENOUS BLD VENIPUNCTURE: CPT | Performed by: PATHOLOGY

## 2021-12-16 PROCEDURE — 99205 OFFICE O/P NEW HI 60 MIN: CPT | Mod: GC | Performed by: INTERNAL MEDICINE

## 2021-12-16 PROCEDURE — G0463 HOSPITAL OUTPT CLINIC VISIT: HCPCS

## 2021-12-16 PROCEDURE — 82533 TOTAL CORTISOL: CPT | Performed by: PSYCHIATRY & NEUROLOGY

## 2021-12-16 ASSESSMENT — PAIN SCALES - GENERAL: PAINLEVEL: NO PAIN (0)

## 2021-12-16 ASSESSMENT — MIFFLIN-ST. JEOR: SCORE: 1581.57

## 2021-12-16 NOTE — NURSING NOTE
Medication Changes: None    Follow Up: Schedule echocardiogram and then follow up as needed with Dr. Barclay      Patient verbalized understanding of all health information given and agreed to call with further questions or concerns.      Terri Morocho RN

## 2021-12-16 NOTE — PATIENT INSTRUCTIONS
"You were seen today in the Cardiovascular Clinic at the [facility].     Cardiology Providers you saw during your visit: Dr. Enid Barclay    Diagnosis: fatigue, syncope    Results: discussed with patient    Orders:   None    Medication Changes:   None    Recommendations:   Hydration  Compression stocking  Stop smoking  Reduce caffeine intake    Echocardiogram    Follow-up:   As needed  Please follow up with primary care provider for medication refills         Please feel free to call me with any questions or concerns.       Krysten Delgado RN     Questions and [phone].   First press #1 for the [employer] and then press #3 for \"Medical Questions\" to reach us Cardiology Nurses.      On Call Cardiologist for after hours or on weekends: [phone]   option #4 and ask to speak to the on-call Cardiologist.          If you need a medication refill please contact your pharmacy.  Please allow 3 business days for your refill to be completed.    "

## 2021-12-16 NOTE — LETTER
12/16/2021      RE: Sharath Jaimes  [address] [facility] [address]       Dear Colleague,    Thank you for the opportunity to participate in the care of your patient, Sharath Jaimes, at the [facility] [place] [facility]. Please see a copy of my visit note below.    I am delighted to see Sharath Jaimes as a new patient in cardiology clinic for evaluation of lightheadedness and a syncopal episode.    History of Present Illness:  The patient is a 56 year old male with a medical history that is most notable for diabetes diagnosed in early 2021 with sensory and autonomic neuropathy, weight loss, possible gastroparesis, and HLD He is referred to cardiology by Dr. Chavez in Neurology for evaluation of his lightheadedness and orthostatic hypotension.    He tells me that he was relatively healthy until his diabetes diagnosis earlier this year. At that time he was experience profound fatigue and was found to have blood sugars in the 600s range. He has since been started on treatment with insulin and metformin. He has had a significant weight loss, reported as 60lbs. He has profound neuropathy for which he is being evaluated by neurology, and gastroparesis symptoms for which he is referred to gastroenterology for further workup.    He has no known cardiovascular history. He does report occasional symptoms of lightheadedness/presyncope and a single syncopal episode that occurred several months ago. The syncope occurred while on his feet at work. He had a prodrome of lightheadedness and tunnel vision before a brief syncopal event. He reports that he regained consciousness within seconds, ad had not symptoms after the syncope. He denies any palpitations, chest pain, or shortness of breath prior to the syncope. Since that episode, he continues to have occasional lightheadedness episodes which occur mostly while on his feet. He gets lightheaded and feels like he may  "pass out. He stops what he is doing and the symptoms quickly resolve. He is now more intentional in his movement, and the symptoms are somewhat improved. He does not have palpitations with these episodes, and frankly denies any palpitations or chest symptoms at all.    He does continue to smoke 1.5 packs per day which he has done for about 25 years. He drinks caffeine \"as much as I can get\". Usually 2-3 energy drinks plus coffee each day. He tells me that he is not eating well, and often skips meals. He is reporting some diarrhea today, but denies that this is a frequent issue of his. He also denies significant vomiting. He had polyuria prior to his diabetes diagnosis, but feels that this has improved recently. He drinks 1-2 glasses of water each day in addition to the coffee and energy drinks.       Past Medical History:  Diabetes  Sensory and autonomic neuropathy  GERD  HLD  Likely gastroparesis     Medications:   Atorvastatin 40mg  Insulin  Metformin  Pregabalin      Allergies:  No Known Allergies      Family History: There is a family history of diabetes. He denies any family history of premature coronary artery disease, heart failure, arrhythmias, or cardiac death.     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 Pertinent negatives include: no chest pain, no shortness of breath, no palpitations, no orthopnea, no PND.      Physical examination  Vitals: /69 (BP Location: Right arm, Patient Position: Supine, Cuff Size: Adult Large)   Pulse 107   Ht 1.81 m (5' 11.26\")   Wt 71.5 kg (157 lb 11.2 oz)   SpO2 100%   BMI 21.83 kg/m    BMI= Body mass index is 21.83 kg/m .     Orthostatic Vital Signs: Supine: 101/69 ; Sittin/61, ; Standing 87/60    Constitutional: In general, the " patient is a pleasant male. He is unkempt and appears uncomfortable.   Cardiovascular: Carotids +2/2 bilaterally without bruits.  No jugular venous distension. Regular tachycardia. Normal S1, S2. No murmur, rub, click, or gallop.   Extremities: Pulses are normal bilaterally throughout. No peripheral edema.  Respiratory: Clear to asculation.  No ronchi, wheezes, rales.        I have reviewed records from [facility]. Relevant findings are: Diagnosed with diabetes in January of 2021 when he presented with fatigue and , A1c > 16.9. No cardiac evaluation.         I have personally and independently reviewed the following:  Labs:   6/8/21: K 3.7, Cr 0.88, , TSH 0.82  4/29/2021: Hgb 15.9  4/7/2021: A1c 9.6  1/11/2021: TChol 223, HDL 38, ,   1/8/2021: A1c >16.9    EKG:   Today (12/16/2021):  Sinus tachycardia at 108 bpm, normal intervals    Assessment :  Sharath Jaimes  is a 56 year old male with a medical history that is most notable for diabetes diagnosed in early 2021 with sensory and autonomic neuropathy, weight loss, possible gastroparesis, and HLD. He is referred to cardiology by Dr. Chavez in Neurology for evaluation of his lightheadedness and orthostatic hypotension. In office today he does not have positive orthostatic vital signs, though he does have a decrease in blood pressure with standings (asymptomatic). His symptoms are most consistent with autonomic dysfunction and orthostatic lightheadedness.     Plan:  We recommend staying well hydrated, being intentional in physical activity, and wearing compression stockings. We discussed these recommendations at length with the patient today. Additionally, counseled on physical maneuvers to limit orthostatic symptoms.   - encouraged adequate hydration, drink several liters of water each day  - compression stockings  - encouraged to stop smoking  - TTE to assess baseline cardiac function  - we do not feel a tilt table test would be  beneficial at this time    The patient was seen and discussed with the attending cardiologist, Dr. Barclay, who is in agreement with the assessment and plan.    Aníbal Martinez MD   Cardiology Fellow    Attestation signed by Enid Barclay MD at 12/16/2021  9:49 PM:  Patient seen and examined with Dr. Martinez. The history and physical findings are accurate as recorded.   Briefly, 57 yo with diabetes type II diagnosed Jan 2021 when he presented with weight loss, A1C > 16.  He smokes, drinks lots of caffeine, does not eat well. Referred for evaluation for lightheadedness with standing. Has peripheral neuropathy. He has been having diarrhea, does not feel well, got sent home from work last night. He is lying on the exam table because he feels weak; he looks unkempt, cachectic.    All labs, imaging studies, ECG and telemetry data have been reviewed personally. Specifically,   EKG: sinus tach.  Orthostatics show reduction in BP from 101/69 to 87/60 with standing without significant changes in HR.    The assessment and plans outlined reflect our joint decision making.  Orthostasis, in setting of type II diabetes with neuropathy. He likely has autonomic dysfunction, however his orthostasis can also be explained by lack of proper nutrition, poor hydration, significant caffeine and tobacco use. Given his diabetes we will obtain 2D echo to assess LV function and diastolic dysfunction. Recommend aggressive hydration, control of diabetes, smoking cessation, and caffeine reduction. A tilt table test is not necessary.     I spent a total of 30 minutes face to face with  Sharath Jaimes during today's office visit. I have spend an additional 30 minutes today on chart review and documentation.      Enid Barclay MD  Cardiology

## 2021-12-16 NOTE — PROGRESS NOTES
I am delighted to see Sharath Jaimes as a new patient in cardiology clinic for evaluation of lightheadedness and a syncopal episode.    History of Present Illness:  The patient is a 56 year old male with a medical history that is most notable for diabetes diagnosed in early 2021 with sensory and autonomic neuropathy, weight loss, possible gastroparesis, and HLD He is referred to cardiology by Dr. Chavez in Neurology for evaluation of his lightheadedness and orthostatic hypotension.    He tells me that he was relatively healthy until his diabetes diagnosis earlier this year. At that time he was experience profound fatigue and was found to have blood sugars in the 600s range. He has since been started on treatment with insulin and metformin. He has had a significant weight loss, reported as 60lbs. He has profound neuropathy for which he is being evaluated by neurology, and gastroparesis symptoms for which he is referred to gastroenterology for further workup.    He has no known cardiovascular history. He does report occasional symptoms of lightheadedness/presyncope and a single syncopal episode that occurred several months ago. The syncope occurred while on his feet at work. He had a prodrome of lightheadedness and tunnel vision before a brief syncopal event. He reports that he regained consciousness within seconds, ad had not symptoms after the syncope. He denies any palpitations, chest pain, or shortness of breath prior to the syncope. Since that episode, he continues to have occasional lightheadedness episodes which occur mostly while on his feet. He gets lightheaded and feels like he may pass out. He stops what he is doing and the symptoms quickly resolve. He is now more intentional in his movement, and the symptoms are somewhat improved. He does not have palpitations with these episodes, and frankly denies any palpitations or chest symptoms at all.    He does continue to smoke 1.5 packs per day which he has done for  "about 25 years. He drinks caffeine \"as much as I can get\". Usually 2-3 energy drinks plus coffee each day. He tells me that he is not eating well, and often skips meals. He is reporting some diarrhea today, but denies that this is a frequent issue of his. He also denies significant vomiting. He had polyuria prior to his diabetes diagnosis, but feels that this has improved recently. He drinks 1-2 glasses of water each day in addition to the coffee and energy drinks.       Past Medical History:  Diabetes  Sensory and autonomic neuropathy  GERD  HLD  Likely gastroparesis     Medications:   Atorvastatin 40mg  Insulin  Metformin  Pregabalin      Allergies:  No Known Allergies      Family History: There is a family history of diabetes. He denies any family history of premature coronary artery disease, heart failure, arrhythmias, or cardiac death.     Psychosocial history: Works with machinery moving [employer]  Smoke: Current 1.5ppd x 25 years  Alcohol: Rare  Recreational drugs: Denies    Pertinent review of systems in additional to listed in HPI: Most notable for fatigue, diarrhea, weight loss, neuropathy pain. Pertinent negatives include: no chest pain, no shortness of breath, no palpitations, no orthopnea, no PND.      Physical examination  Vitals: /69 (BP Location: Right arm, Patient Position: Supine, Cuff Size: Adult Large)   Pulse 107   Ht 1.81 m (5' 11.26\")   Wt 71.5 kg (157 lb 11.2 oz)   SpO2 100%   BMI 21.83 kg/m    BMI= Body mass index is 21.83 kg/m .     Orthostatic Vital Signs: Supine: 101/69 ; Sittin/61, ; Standing 87/60    Constitutional: In general, the patient is a pleasant male. He is unkempt and appears uncomfortable.   Cardiovascular: Carotids +2/2 bilaterally without bruits.  No jugular venous distension. Regular tachycardia. Normal S1, S2. No murmur, rub, click, or gallop.   Extremities: Pulses are normal bilaterally throughout. No peripheral edema.  Respiratory: Clear to " asculation.  No ronchi, wheezes, rales.        I have reviewed records from [facility]. Relevant findings are: Diagnosed with diabetes in January of 2021 when he presented with fatigue and , A1c > 16.9. No cardiac evaluation.         I have personally and independently reviewed the following:  Labs:   6/8/21: K 3.7, Cr 0.88, , TSH 0.82  4/29/2021: Hgb 15.9  4/7/2021: A1c 9.6  1/11/2021: TChol 223, HDL 38, ,   1/8/2021: A1c >16.9    EKG:   Today (12/16/2021):  Sinus tachycardia at 108 bpm, normal intervals    Assessment :  Sharath Jaimes  is a 56 year old male with a medical history that is most notable for diabetes diagnosed in early 2021 with sensory and autonomic neuropathy, weight loss, possible gastroparesis, and HLD. He is referred to cardiology by Dr. Chavez in Neurology for evaluation of his lightheadedness and orthostatic hypotension. In office today he does not have positive orthostatic vital signs, though he does have a decrease in blood pressure with standings (asymptomatic). His symptoms are most consistent with autonomic dysfunction and orthostatic lightheadedness.     Plan:  We recommend staying well hydrated, being intentional in physical activity, and wearing compression stockings. We discussed these recommendations at length with the patient today. Additionally, counseled on physical maneuvers to limit orthostatic symptoms.   - encouraged adequate hydration, drink several liters of water each day  - compression stockings  - encouraged to stop smoking  - TTE to assess baseline cardiac function  - we do not feel a tilt table test would be beneficial at this time    The patient was seen and discussed with the attending cardiologist, Dr. Barclay, who is in agreement with the assessment and plan.    Aníbal Martinez MD   Cardiology Fellow

## 2022-02-02 ENCOUNTER — PRE VISIT (OUTPATIENT)
Dept: NEUROLOGY | Facility: CLINIC | Age: 57
End: 2022-02-02

## 2022-02-10 ENCOUNTER — OFFICE VISIT (OUTPATIENT)
Dept: NEUROLOGY | Facility: CLINIC | Age: 57
End: 2022-02-10
Payer: COMMERCIAL

## 2022-02-10 VITALS
OXYGEN SATURATION: 97 % | HEART RATE: 94 BPM | DIASTOLIC BLOOD PRESSURE: 65 MMHG | RESPIRATION RATE: 16 BRPM | SYSTOLIC BLOOD PRESSURE: 96 MMHG

## 2022-02-10 DIAGNOSIS — E11.42 TYPE 2 DIABETES MELLITUS WITH DIABETIC POLYNEUROPATHY, WITH LONG-TERM CURRENT USE OF INSULIN (H): Primary | ICD-10-CM

## 2022-02-10 DIAGNOSIS — Z79.4 TYPE 2 DIABETES MELLITUS WITH DIABETIC POLYNEUROPATHY, WITH LONG-TERM CURRENT USE OF INSULIN (H): Primary | ICD-10-CM

## 2022-02-10 PROCEDURE — 99213 OFFICE O/P EST LOW 20 MIN: CPT | Performed by: PSYCHIATRY & NEUROLOGY

## 2022-02-10 ASSESSMENT — PAIN SCALES - GENERAL: PAINLEVEL: MODERATE PAIN (4)

## 2022-02-10 NOTE — LETTER
2/10/2022       RE: Sharath Jaimes  [address]  [facility] [address]     Dear Colleague,    Thank you for referring your patient, Sharath Jaimes, to the [facility]UR[facility]. Please see a copy of my visit note below.    Service Date: 02/10/2022    DO Brenden Dodge [facility]  [address] [place] [address], [address]  [place]  [address]    RE:  Sharath Jaimes  MRN:  [id number]  :  1965    Dear Dr. 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  Mr. Jaimes reports that his sensory symptoms continue to improve.  As I prepared to examine him, he shared some frustration with his living circumstances.  Further conversation made it clear that he has some passive suicidality and stressors including interpersonal issues, difficulty performing his current job responsibilities because of his medical condition, and fatigue.  He did indicate in clear terms that he has no active suicidal intent or plans and did agree to a behavioral health consultation, which was scheduled for the following day.  As Mr. Jaimes indicated that he had discontinued his medications, I contacted your office and I did obtain laboratory studies.  I discussed results with one of your partners who agreed to reinitiate his medications, reach out to the patient, and establish social service assistance through your office as well.    The full visit was focused on these issues and I did not complete a neurologic examination.    Mr. Jaimes has also been seen in cardiology consultation and has a scheduled GI appointment at the [facility].  I would be happy to see him on an as-needed basis going forward should his neuromuscular condition progress.    Sincerely,    Rocky Chavez MD        D: 2022   T: 2022    MT: sheri    Name:     JESICA MERCADO  MRN:      [id number]        Account:      [id number]   :      1965           Service Date: 02/10/2022       Document: [id number]        Again, thank you for allowing me to participate in the care of your patient.      Sincerely,    Rocky Chavez MD

## 2022-02-10 NOTE — LETTER
Date:February 18, 2022      Patient was self referred, no letter generated. Do not send.        [facility] Health Information

## 2022-02-10 NOTE — PATIENT INSTRUCTIONS
Return here at 9:45 tomorrow.      Here are crisis care recommendations:    Refer to the resources below as needed.     Steps to care for yourself     If you are currently in counseling, call your counselor for an appointment   Call the local crisis resources below if needed.   Contact friends or family for support.   Get more exercise.   Do activities you enjoy.   Eat a well-balanced diet and drink plenty of fluids.   Rest as needed.   Limit alcohol and recreational drugs. These can worsen depression.     When to contact your primary care provider     You have thoughts of harming or killing yourself but have not made a plan to carry it out.   Your depression gets in the way of daily activities.   You are often unable to sleep.   You need help cutting back on alcohol or recreational drugs.     When to call 911 or go to the Emergency Room     Get emergency help right away if you have any of the following:   You are planning to harm or kill yourself and you have a way to carry out the plan.   You have injured yourself or others. Or, you think you will.   You feel confused or are having trouble thinking or remembering.   You are having delusions (false beliefs).   You are hearing voices or seeing things that aren't there.   You are feeling psychotic (paranoid, fearful, restless, agitated, nervous, racing thoughts or speech)     Crisis Resources     These hotlines are for both adults and children. The and are open 24 hours a day, 7 days a week unless noted otherwise.     National Suicide Prevention Lifeline   3-842-204-TALK (0284)     Crisis Text Line   www.crisistextline.org   Text HOME to 191437 from anywhere in the [place], anytime, about any type of crisis. A live, trained crisis counselor will receive the text and respond quickly.     [facility] Lifeline for LGBTQ Youth   A national crisis intervention and suicide lifeline for LGBTQ youth under 25. Provides a safe place to talk without judgement.    Call  9-291-028-4006; text START to 368170 or visit www.thetrevorproject.org to talk to a trained counselor.      For [facility] crisis numbers, visit the [place] DHS website at:   https://[place].gov/dhs/people-we-serve/adults/health-care/mental-health/resources/crisis-contacts.jsp

## 2022-02-11 ENCOUNTER — LAB (OUTPATIENT)
Dept: LAB | Facility: CLINIC | Age: 57
End: 2022-02-11

## 2022-02-11 ENCOUNTER — VIRTUAL VISIT (OUTPATIENT)
Dept: BEHAVIORAL HEALTH | Facility: CLINIC | Age: 57
End: 2022-02-11
Payer: COMMERCIAL

## 2022-02-11 DIAGNOSIS — Z79.4 TYPE 2 DIABETES MELLITUS WITH DIABETIC POLYNEUROPATHY, WITH LONG-TERM CURRENT USE OF INSULIN (H): ICD-10-CM

## 2022-02-11 DIAGNOSIS — E11.42 TYPE 2 DIABETES MELLITUS WITH DIABETIC POLYNEUROPATHY, WITH LONG-TERM CURRENT USE OF INSULIN (H): ICD-10-CM

## 2022-02-11 DIAGNOSIS — F06.31 DEPRESSIVE DISORDER DUE TO ANOTHER MEDICAL CONDITION WITH DEPRESSIVE FEATURES: Primary | ICD-10-CM

## 2022-02-11 LAB
ALBUMIN SERPL-MCNC: 3.7 G/DL (ref 3.4–5)
ALP SERPL-CCNC: 74 U/L (ref 40–150)
ALT SERPL W P-5'-P-CCNC: 26 U/L (ref 0–70)
ANION GAP SERPL CALCULATED.3IONS-SCNC: 6 MMOL/L (ref 3–14)
AST SERPL W P-5'-P-CCNC: 15 U/L (ref 0–45)
BASOPHILS # BLD AUTO: 0.1 10E3/UL (ref 0–0.2)
BASOPHILS NFR BLD AUTO: 1 %
BILIRUB SERPL-MCNC: 0.5 MG/DL (ref 0.2–1.3)
BUN SERPL-MCNC: 17 MG/DL (ref 7–30)
CALCIUM SERPL-MCNC: 9 MG/DL (ref 8.5–10.1)
CHLORIDE BLD-SCNC: 102 MMOL/L (ref 94–109)
CO2 SERPL-SCNC: 27 MMOL/L (ref 20–32)
CREAT SERPL-MCNC: 0.83 MG/DL (ref 0.66–1.25)
EOSINOPHIL # BLD AUTO: 0.2 10E3/UL (ref 0–0.7)
EOSINOPHIL NFR BLD AUTO: 3 %
ERYTHROCYTE [DISTWIDTH] IN BLOOD BY AUTOMATED COUNT: 11.9 % (ref 10–15)
GFR SERPL CREATININE-BSD FRML MDRD: >90 ML/MIN/1.73M2
GLUCOSE BLD-MCNC: 464 MG/DL (ref 70–99)
HBA1C MFR BLD: 12.6 % (ref 0–5.6)
HCT VFR BLD AUTO: 38.5 % (ref 40–53)
HGB BLD-MCNC: 13.3 G/DL (ref 13.3–17.7)
IMM GRANULOCYTES # BLD: 0 10E3/UL
IMM GRANULOCYTES NFR BLD: 0 %
LYMPHOCYTES # BLD AUTO: 2 10E3/UL (ref 0.8–5.3)
LYMPHOCYTES NFR BLD AUTO: 35 %
MCH RBC QN AUTO: 30.8 PG (ref 26.5–33)
MCHC RBC AUTO-ENTMCNC: 34.5 G/DL (ref 31.5–36.5)
MCV RBC AUTO: 89 FL (ref 78–100)
MONOCYTES # BLD AUTO: 0.4 10E3/UL (ref 0–1.3)
MONOCYTES NFR BLD AUTO: 8 %
NEUTROPHILS # BLD AUTO: 3 10E3/UL (ref 1.6–8.3)
NEUTROPHILS NFR BLD AUTO: 53 %
NRBC # BLD AUTO: 0 10E3/UL
NRBC BLD AUTO-RTO: 0 /100
PLATELET # BLD AUTO: 200 10E3/UL (ref 150–450)
POTASSIUM BLD-SCNC: 4.3 MMOL/L (ref 3.4–5.3)
PROT SERPL-MCNC: 6.7 G/DL (ref 6.8–8.8)
RBC # BLD AUTO: 4.32 10E6/UL (ref 4.4–5.9)
SODIUM SERPL-SCNC: 135 MMOL/L (ref 133–144)
WBC # BLD AUTO: 5.7 10E3/UL (ref 4–11)

## 2022-02-11 PROCEDURE — 90832 PSYTX W PT 30 MINUTES: CPT | Mod: 95 | Performed by: PSYCHOLOGIST

## 2022-02-11 PROCEDURE — 85025 COMPLETE CBC W/AUTO DIFF WBC: CPT | Performed by: PATHOLOGY

## 2022-02-11 PROCEDURE — 36415 COLL VENOUS BLD VENIPUNCTURE: CPT | Performed by: PATHOLOGY

## 2022-02-11 PROCEDURE — 80053 COMPREHEN METABOLIC PANEL: CPT | Performed by: PATHOLOGY

## 2022-02-11 PROCEDURE — 83036 HEMOGLOBIN GLYCOSYLATED A1C: CPT | Performed by: PATHOLOGY

## 2022-02-11 ASSESSMENT — COLUMBIA-SUICIDE SEVERITY RATING SCALE - C-SSRS
2. HAVE YOU ACTUALLY HAD ANY THOUGHTS OF KILLING YOURSELF LIFETIME?: NO
3. HAVE YOU BEEN THINKING ABOUT HOW YOU MIGHT KILL YOURSELF?: NO
4. HAVE YOU HAD THESE THOUGHTS AND HAD SOME INTENTION OF ACTING ON THEM?: NO
6. HAVE YOU EVER DONE ANYTHING, STARTED TO DO ANYTHING, OR PREPARED TO DO ANYTHING TO END YOUR LIFE?: NO
5. HAVE YOU STARTED TO WORK OUT OR WORKED OUT THE DETAILS OF HOW TO KILL YOURSELF? DO YOU INTEND TO CARRY OUT THIS PLAN?: NO
TOTAL  NUMBER OF INTERRUPTED ATTEMPTS PAST 3 MONTHS: NO
TOTAL  NUMBER OF ABORTED OR SELF INTERRUPTED ATTEMPTS PAST 3 MONTHS: NO
6. HAVE YOU EVER DONE ANYTHING, STARTED TO DO ANYTHING, OR PREPARED TO DO ANYTHING TO END YOUR LIFE?: NO
TOTAL  NUMBER OF ABORTED OR SELF INTERRUPTED ATTEMPTS PAST LIFETIME: NO
2. HAVE YOU ACTUALLY HAD ANY THOUGHTS OF KILLING YOURSELF?: NO
ATTEMPT LIFETIME: NO
1. IN THE PAST MONTH, HAVE YOU WISHED YOU WERE DEAD OR WISHED YOU COULD GO TO SLEEP AND NOT WAKE UP?: YES
ATTEMPT PAST THREE MONTHS: NO
TOTAL  NUMBER OF INTERRUPTED ATTEMPTS LIFETIME: NO
4. HAVE YOU HAD THESE THOUGHTS AND HAD SOME INTENTION OF ACTING ON THEM?: NO
1. IN THE PAST MONTH, HAVE YOU WISHED YOU WERE DEAD OR WISHED YOU COULD GO TO SLEEP AND NOT WAKE UP?: YES
5. HAVE YOU STARTED TO WORK OUT OR WORKED OUT THE DETAILS OF HOW TO KILL YOURSELF? DO YOU INTEND TO CARRY OUT THIS PLAN?: NO

## 2022-02-11 NOTE — LETTER
"2/11/2022      RE: Sharath Jaimes  [address] [facility] [address]       [facility]: Integrated Behavioral Health  February 11, 2022      Behavioral Health Clinician Progress Note    Patient Name: Sharath Jaimes           Service Type: Phone Visit      Service Location: patient in clinic      Session Start Time: 10:00  Session End Time: 10:35      Session Length: 16 - 37      Attendees: Patient    Visit Activities (Refresh list every visit): NEW, [facility] Only and Phone Encounter     Sharath Jaimes is a 56 year old male who is being evaluated via a telephone visit.      The patient has been notified of the following:     \"We have found that certain health care needs can be provided without the need for a face to face visit.  This service lets us provide the care you need with a short phone conversation.      I will have full access to your [facility] medical record during this entire phone call.   I will be taking notes for your medical record.     Since this is like an office visit, we will bill your insurance company for this service.  Please check with your medical insurance if this type of telephone visit/virtual care is covered.  You may be responsible for the cost of this service if insurance coverage is denied.      There are potential benefits and risks of telephone visits (e.g. limits to patient confidentiality) that differ from in-person visits.?  Confidentiality still applies for telephone services, and nobody will record the visit.  It is important to be in a quiet, private space that is free of distractions (including cell phone or other devices) during the visit.??     If during the course of the call I believe a telephone visit is not appropriate, you will not be charged for this service\"    Consent has been obtained for this service by care team member: yes.    Diagnostic Assessment Date: NA  Treatment Plan Review Date: NA  See Flowsheets for today's PHQ-9 " "and NIMCO-7 results  Previous PHQ-9: No flowsheet data found.  Previous NIMCO-7: No flowsheet data found.    DATA  Extended Session (60+ minutes): No  Interactive Complexity: No  Crisis: No    Treatment Objective(s) Addressed in This Session:  Target Behavior(s): disease management/lifestyle changes related to diabetes management    Psychological distress related to Chronic Disease Management    Current Stressors / Issues:  [facility] met with Sharath today over telephone at the request of Dr. Rocky Chavez [place] [facility] [place] was scheduled yesterday at Dr. Chavez's request for Sharath during his appointment. Sharath indicates longstanding difficulties with diabetes and related problems with neuropathy and low energy. He disclosed yesterday and today during this call that he has not been taking medication for his diabetes including his metformin and is not checking his blood sugar levels. He reports running out of his medication several months ago and has not attempted to get them refilled. Sharath was adamant today that he is not suicidal, though states \"I've had a good life, if I could just go to sleep and not wake up, that would be great.\" He denied having a plan or identified method for self harm. He also cites his family and kimmie as protective factors.     When asked about his identified reasons why engaging with diabetes management would be important to him, Sharath identified he should do this \"out of principal\" for his doctors, meaning he knows he should for extending his life. Sharath notes however that he has concern taking care of his diabetes would only result in more pain and a lower quality of life, even if it is longer. Discussed some of the risks associated with poor diabetes management and this writer also encouraged him to connect with what he could benefit from doing so. Sharath notes he is worried about getting let go from his work due to his health problems and slowly being unable to keep " up with work demands. We explored a bit of how taking care of his diabetes could help with this.     Sharath declined to continue meeting with behavioral health services for now. He reports his next step is to reach out to schedule with his PCP for medication refills and this writer affirmed his thoughts about moving forward with this and provided encouragement.         Progress on Treatment Objective(s) / Homework:  Worsening - PRECONTEMPLATION (Not seeing need for change); Intervened by educating the patient about the effects of current behavior on health.  Evoked information about reasons to continue behavior, express concern / recommendations, and explored any change talk    Motivational Interviewing    MI Intervention: Expressed Empathy/Understanding, Supported Autonomy, Collaboration, Evocation, Permission to raise concern or advise, Open-ended questions, Reflections: simple and complex, Rolled with resistance: Emphasized patient autonomy, Simple reflection, Complex reflection, Amplified reflection (weaker or stronger meaning), Reframed sustain talk in the direction of change and Evoked patient agenda, Change talk (evoked) and Reframe     Change Talk Expressed by the Patient: Need to change Taking steps    Provider Response to Change Talk: E - Evoked more info from patient about behavior change, A - Affirmed patient's thoughts, decisions, or attempts at behavior change, R - Reflected patient's change talk and S - Summarized patient's change talk statements    Also provided psychoeducation about behavioral health condition, symptoms, and treatment options    Care Plan review completed: Yes    Medication Review:  No changes to current psychiatric medication(s)    Medication Compliance:  No with diabetes medications    Changes in Health Issues:   Yes: Diabetes, Associated Psychological Distress    Chemical Use Review:    Social History     Tobacco Use     Smoking status: Current Every Day Smoker     Packs/day: 1.50      Years: 20.00     Pack years: 30.00     Types: Cigarettes     Smokeless tobacco: Never Used   Substance Use Topics     Alcohol use: Yes     Drug use: Never         Assessment: Current Emotional / Mental Status (status of significant symptoms):  Risk status (Self / Other harm or suicidal ideation)  Patient denies a history of suicidal ideation, suicide attempts, self-injurious behavior, homicidal ideation, homicidal behavior and and other safety concerns  Patient denies current fears or concerns for personal safety.  Patient denies current or recent suicidal ideation or behaviors.  Patient denies current or recent homicidal ideation or behaviors.  Patient denies current or recent self injurious behavior or ideation.  Patient denies other safety concerns.  A safety and risk management plan has not been developed at this time, however patient was encouraged to call [place] [phone] should there be a change in any of these risk factors.     Patient was provided crisis resources by Dr. Chavez on 2/10/2022    [place] Suicide Severity Rating Scale (Lifetime/Recent)  [place] Suicide Severity Rating (Lifetime/Recent) 2/11/2022   1. Wish to be Dead (Lifetime) Yes   1. Wish to be Dead (Recent) Yes   2. Non-Specific Active Suicidal Thoughts (Lifetime) No   2. Non-Specific Active Suicidal Thoughts (Recent) No   3. Active Suicidal Ideation with any Methods (Not Plan) Without Intent to Act (Lifetime) No   3. Active Suicidal Ideation with any Methods (Not Plan) Without Intent to Act (Recent) No   4. Active Suicidal Ideation with Some Intent to Act, Without Specific Plan (Lifetime) No   4. Active Suicidal Ideation with Some Intent to Act, Without Specific Plan (Recent) No   5. Active Suicidal Ideation with Specific Plan and Intent (Lifetime) No   5. Active Suicidal Ideation with Specific Plan and Intent (Recent) No   Most Severe Ideation Rating (Lifetime) NA   Frequency (Lifetime) NA   Duration (Lifetime) NA   Controllability  (Lifetime) NA   Protective Factors  (Lifetime) NA   Reasons for Ideation (Lifetime) NA   Most Severe Ideation Rating (Past Month) NA   Frequency (Past Month) NA   Duration (Past Month) NA   Controllability (Past Month) NA   Protective Factors (Past Month) NA   Reasons for Ideation (Past Month) NA   Actual Attempt (Lifetime) No   Actual Attempt (Past 3 Months) No   Has subject engaged in non-suicidal self-injurious behavior? (Lifetime) No   Has subject engaged in non-suicidal self-injurious behavior? (Past 3 Months) No   Interrupted Attempts (Lifetime) No   Interrupted Attempts (Past 3 Months) No   Aborted or Self-Interrupted Attempt (Lifetime) No   Aborted or Self-Interrupted Attempt (Past 3 Months) No   Preparatory Acts or Behavior (Lifetime) No   Preparatory Acts or Behavior (Past 3 Months) No   Most Recent Attempt Actual Lethality Code NA   Most Lethal Attempt Actual Lethality Code NA   Initial/First Attempt Actual Lethality Code NA       Appearance:   Unable to assess   Eye Contact:   Unable to assess   Psychomotor Behavior: Unable to assess   Attitude:   Cooperative   Orientation:   All  Speech   Rate / Production: Normal    Volume:  Normal   Mood:    Irritable   Affect:    Appropriate   Thought Content:  Clear   Thought Form:  Coherent  Logical   Insight:    Fair  and External locus    Diagnoses:  1. 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  He was also given information about mental health symptoms and treatment options .  CD Recommendations: Smoking cessation should be encouraged.     February 11, 2022        Edgar Perdomo

## 2022-02-11 NOTE — LETTER
"2/11/2022       RE: Sharath Jaimes  [address] [facility] [address]     Dear Colleague,    Thank you for referring your patient, Sharath Jaimes, to the [facility] MENTAL HEALTH & ADDICTION SERVICES at [facility]. Please see a copy of my visit note below.    [facility]: Integrated Behavioral Health  February 11, 2022      Behavioral Health Clinician Progress Note    Patient Name: Sharath Jaimes           Service Type: Phone Visit      Service Location: patient in clinic      Session Start Time: 10:00  Session End Time: 10:35      Session Length: 16 - 37      Attendees: Patient    Visit Activities (Refresh list every visit): NEW, [facility] Only and Phone Encounter     Sharath Jaimes is a 56 year old male who is being evaluated via a telephone visit.      The patient has been notified of the following:     \"We have found that certain health care needs can be provided without the need for a face to face visit.  This service lets us provide the care you need with a short phone conversation.      I will have full access to your [facility] medical record during this entire phone call.   I will be taking notes for your medical record.     Since this is like an office visit, we will bill your insurance company for this service.  Please check with your medical insurance if this type of telephone visit/virtual care is covered.  You may be responsible for the cost of this service if insurance coverage is denied.      There are potential benefits and risks of telephone visits (e.g. limits to patient confidentiality) that differ from in-person visits.?  Confidentiality still applies for telephone services, and nobody will record the visit.  It is important to be in a quiet, private space that is free of distractions (including cell phone or other devices) during the visit.??     If during the course of the call I believe a " "telephone visit is not appropriate, you will not be charged for this service\"    Consent has been obtained for this service by care team member: yes.    Diagnostic Assessment Date: NA  Treatment Plan Review Date: NA  See Flowsheets for today's PHQ-9 and NIMCO-7 results  Previous PHQ-9: No flowsheet data found.  Previous NIMCO-7: No flowsheet data found.    DATA  Extended Session (60+ minutes): No  Interactive Complexity: No  Crisis: No    Treatment Objective(s) Addressed in This Session:  Target Behavior(s): disease management/lifestyle changes related to diabetes management    Psychological distress related to Chronic Disease Management    Current Stressors / Issues:  [facility] met with Sharath law over telephone at the request of Dr. Rocky Chavez [place] [facility] [place] This appointment was scheduled yesterday at Dr. Chavez's request for Sharath during his appointment. Sharath indicates longstanding difficulties with diabetes and related problems with neuropathy and low energy. He disclosed yesterday and today during this call that he has not been taking medication for his diabetes including his metformin and is not checking his blood sugar levels. He reports running out of his medication several months ago and has not attempted to get them refilled. Sharath was adamant today that he is not suicidal, though states \"I've had a good life, if I could just go to sleep and not wake up, that would be great.\" He denied having a plan or identified method for self harm. He also cites his family and kimmie as protective factors.     When asked about his identified reasons why engaging with diabetes management would be important to him, Sharath identified he should do this \"out of principal\" for his doctors, meaning he knows he should for extending his life. Sharath notes however that he has concern taking care of his diabetes would only result in more pain and a lower quality of life, even if it is longer. Discussed some of the risks " associated with poor diabetes management and this writer also encouraged him to connect with what he could benefit from doing so. Sharath notes he is worried about getting let go from his work due to his health problems and slowly being unable to keep up with work demands. We explored a bit of how taking care of his diabetes could help with this.     Sharath declined to continue meeting with behavioral health services for now. He reports his next step is to reach out to schedule with his PCP for medication refills and this writer affirmed his thoughts about moving forward with this and provided encouragement.         Progress on Treatment Objective(s) / Homework:  Worsening - PRECONTEMPLATION (Not seeing need for change); Intervened by educating the patient about the effects of current behavior on health.  Evoked information about reasons to continue behavior, express concern / recommendations, and explored any change talk    Motivational Interviewing    MI Intervention: Expressed Empathy/Understanding, Supported Autonomy, Collaboration, Evocation, Permission to raise concern or advise, Open-ended questions, Reflections: simple and complex, Rolled with resistance: Emphasized patient autonomy, Simple reflection, Complex reflection, Amplified reflection (weaker or stronger meaning), Reframed sustain talk in the direction of change and Evoked patient agenda, Change talk (evoked) and Reframe     Change Talk Expressed by the Patient: Need to change Taking steps    Provider Response to Change Talk: E - Evoked more info from patient about behavior change, A - Affirmed patient's thoughts, decisions, or attempts at behavior change, R - Reflected patient's change talk and S - Summarized patient's change talk statements    Also provided psychoeducation about behavioral health condition, symptoms, and treatment options    Care Plan review completed: Yes    Medication Review:  No changes to current psychiatric  medication(s)    Medication Compliance:  No with diabetes medications    Changes in Health Issues:   Yes: Diabetes, Associated Psychological Distress    Chemical Use Review:    Social History     Tobacco Use     Smoking status: Current Every Day Smoker     Packs/day: 1.50     Years: 20.00     Pack years: 30.00     Types: Cigarettes     Smokeless tobacco: Never Used   Substance Use Topics     Alcohol use: Yes     Drug use: Never         Assessment: Current Emotional / Mental Status (status of significant symptoms):  Risk status (Self / Other harm or suicidal ideation)  Patient denies a history of suicidal ideation, suicide attempts, self-injurious behavior, homicidal ideation, homicidal behavior and and other safety concerns  Patient denies current fears or concerns for personal safety.  Patient denies current or recent suicidal ideation or behaviors.  Patient denies current or recent homicidal ideation or behaviors.  Patient denies current or recent self injurious behavior or ideation.  Patient denies other safety concerns.  A safety and risk management plan has not been developed at this time, however patient was encouraged to call [place] [phone] should there be a change in any of these risk factors.     Patient was provided crisis resources by Dr. Chavez on 2/10/2022    [place] Suicide Severity Rating Scale (Lifetime/Recent)  [place] Suicide Severity Rating (Lifetime/Recent) 2/11/2022   1. Wish to be Dead (Lifetime) Yes   1. Wish to be Dead (Recent) Yes   2. Non-Specific Active Suicidal Thoughts (Lifetime) No   2. Non-Specific Active Suicidal Thoughts (Recent) No   3. Active Suicidal Ideation with any Methods (Not Plan) Without Intent to Act (Lifetime) No   3. Active Suicidal Ideation with any Methods (Not Plan) Without Intent to Act (Recent) No   4. Active Suicidal Ideation with Some Intent to Act, Without Specific Plan (Lifetime) No   4. Active Suicidal Ideation with Some Intent to Act, Without Specific  Plan (Recent) No   5. Active Suicidal Ideation with Specific Plan and Intent (Lifetime) No   5. Active Suicidal Ideation with Specific Plan and Intent (Recent) No   Most Severe Ideation Rating (Lifetime) NA   Frequency (Lifetime) NA   Duration (Lifetime) NA   Controllability (Lifetime) NA   Protective Factors  (Lifetime) NA   Reasons for Ideation (Lifetime) NA   Most Severe Ideation Rating (Past Month) NA   Frequency (Past Month) NA   Duration (Past Month) NA   Controllability (Past Month) NA   Protective Factors (Past Month) NA   Reasons for Ideation (Past Month) NA   Actual Attempt (Lifetime) No   Actual Attempt (Past 3 Months) No   Has subject engaged in non-suicidal self-injurious behavior? (Lifetime) No   Has subject engaged in non-suicidal self-injurious behavior? (Past 3 Months) No   Interrupted Attempts (Lifetime) No   Interrupted Attempts (Past 3 Months) No   Aborted or Self-Interrupted Attempt (Lifetime) No   Aborted or Self-Interrupted Attempt (Past 3 Months) No   Preparatory Acts or Behavior (Lifetime) No   Preparatory Acts or Behavior (Past 3 Months) No   Most Recent Attempt Actual Lethality Code NA   Most Lethal Attempt Actual Lethality Code NA   Initial/First Attempt Actual Lethality Code NA       Appearance:   Unable to assess   Eye Contact:   Unable to assess   Psychomotor Behavior: Unable to assess   Attitude:   Cooperative   Orientation:   All  Speech   Rate / Production: Normal    Volume:  Normal   Mood:    Irritable   Affect:    Appropriate   Thought Content:  Clear   Thought Form:  Coherent  Logical   Insight:    Fair  and External locus    Diagnoses:  1. 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  He was  also given information about mental health symptoms and treatment options .  CD Recommendations: Smoking cessation should be encouraged.   February 11, 2022    Again, thank you for allowing me to participate in the care of your patient.      Sincerely,    Edgar Perdomo

## 2022-02-11 NOTE — PROGRESS NOTES
"[facility]: Integrated Behavioral Health  February 11, 2022      Behavioral Health Clinician Progress Note    Patient Name: Sharath Jaimes           Service Type: Phone Visit      Service Location: patient in clinic      Session Start Time: 10:00  Session End Time: 10:35      Session Length: 16 - 37      Attendees: Patient    Visit Activities (Refresh list every visit): NEW, [facility] Only and Phone Encounter     Sharath Jaimes is a 56 year old male who is being evaluated via a telephone visit.      The patient has been notified of the following:     \"We have found that certain health care needs can be provided without the need for a face to face visit.  This service lets us provide the care you need with a short phone conversation.      I will have full access to your [facility] medical record during this entire phone call.   I will be taking notes for your medical record.     Since this is like an office visit, we will bill your insurance company for this service.  Please check with your medical insurance if this type of telephone visit/virtual care is covered.  You may be responsible for the cost of this service if insurance coverage is denied.      There are potential benefits and risks of telephone visits (e.g. limits to patient confidentiality) that differ from in-person visits.?  Confidentiality still applies for telephone services, and nobody will record the visit.  It is important to be in a quiet, private space that is free of distractions (including cell phone or other devices) during the visit.??     If during the course of the call I believe a telephone visit is not appropriate, you will not be charged for this service\"    Consent has been obtained for this service by care team member: yes.    Diagnostic Assessment Date: NA  Treatment Plan Review Date: NA  See Flowsheets for today's PHQ-9 and NIMCO-7 results  Previous PHQ-9: No flowsheet data found.  Previous NIMCO-7: No " "flowsheet data found.    DATA  Extended Session (60+ minutes): No  Interactive Complexity: No  Crisis: No    Treatment Objective(s) Addressed in This Session:  Target Behavior(s): disease management/lifestyle changes related to diabetes management    Psychological distress related to Chronic Disease Management    Current Stressors / Issues:  [facility] met with Sharath today over telephone at the request of Dr. Rocky Chavez from our Neurology clinic [facility]. This appointment was scheduled yesterday at Dr. Chavez's request for Sharath during his appointment. Sharath indicates longstanding difficulties with diabetes and related problems with neuropathy and low energy. He disclosed yesterday and today during this call that he has not been taking medication for his diabetes including his metformin and is not checking his blood sugar levels. He reports running out of his medication several months ago and has not attempted to get them refilled. Sharath was adamant today that he is not suicidal, though states \"I've had a good life, if I could just go to sleep and not wake up, that would be great.\" He denied having a plan or identified method for self harm. He also cites his family and kimmie as protective factors.     When asked about his identified reasons why engaging with diabetes management would be important to him, Sharath identified he should do this \"out of principal\" for his doctors, meaning he knows he should for extending his life. Sharath notes however that he has concern taking care of his diabetes would only result in more pain and a lower quality of life, even if it is longer. Discussed some of the risks associated with poor diabetes management and this writer also encouraged him to connect with what he could benefit from doing so. Sharath notes he is worried about getting let go from his work due to his health problems and slowly being unable to keep up with work demands. We explored a bit of how taking care of his diabetes could " help with this.     Sharath declined to continue meeting with behavioral health services for now. He reports his next step is to reach out to schedule with his PCP for medication refills and this writer affirmed his thoughts about moving forward with this and provided encouragement.         Progress on Treatment Objective(s) / Homework:  Worsening - PRECONTEMPLATION (Not seeing need for change); Intervened by educating the patient about the effects of current behavior on health.  Evoked information about reasons to continue behavior, express concern / recommendations, and explored any change talk    Motivational Interviewing    MI Intervention: Expressed Empathy/Understanding, Supported Autonomy, Collaboration, Evocation, Permission to raise concern or advise, Open-ended questions, Reflections: simple and complex, Rolled with resistance: Emphasized patient autonomy, Simple reflection, Complex reflection, Amplified reflection (weaker or stronger meaning), Reframed sustain talk in the direction of change and Evoked patient agenda, Change talk (evoked) and Reframe     Change Talk Expressed by the Patient: Need to change Taking steps    Provider Response to Change Talk: E - Evoked more info from patient about behavior change, A - Affirmed patient's thoughts, decisions, or attempts at behavior change, R - Reflected patient's change talk and S - Summarized patient's change talk statements    Also provided psychoeducation about behavioral health condition, symptoms, and treatment options    Care Plan review completed: Yes    Medication Review:  No changes to current psychiatric medication(s)    Medication Compliance:  No with diabetes medications    Changes in Health Issues:   Yes: Diabetes, Associated Psychological Distress    Chemical Use Review:    Social History     Tobacco Use     Smoking status: Current Every Day Smoker     Packs/day: 1.50     Years: 20.00     Pack years: 30.00     Types: Cigarettes     Smokeless  tobacco: Never Used   Substance Use Topics     Alcohol use: Yes     Drug use: Never         Assessment: Current Emotional / Mental Status (status of significant symptoms):  Risk status (Self / Other harm or suicidal ideation)  Patient denies a history of suicidal ideation, suicide attempts, self-injurious behavior, homicidal ideation, homicidal behavior and and other safety concerns  Patient denies current fears or concerns for personal safety.  Patient denies current or recent suicidal ideation or behaviors.  Patient denies current or recent homicidal ideation or behaviors.  Patient denies current or recent self injurious behavior or ideation.  Patient denies other safety concerns.  A safety and risk management plan has not been developed at this time, however patient was encouraged to call [place] [phone] should there be a change in any of these risk factors.     Patient was provided crisis resources by Dr. Chavez on 2/10/2022    [place] Suicide Severity Rating Scale (Lifetime/Recent)  [place] Suicide Severity Rating (Lifetime/Recent) 2/11/2022   1. Wish to be Dead (Lifetime) Yes   1. Wish to be Dead (Recent) Yes   2. Non-Specific Active Suicidal Thoughts (Lifetime) No   2. Non-Specific Active Suicidal Thoughts (Recent) No   3. Active Suicidal Ideation with any Methods (Not Plan) Without Intent to Act (Lifetime) No   3. Active Suicidal Ideation with any Methods (Not Plan) Without Intent to Act (Recent) No   4. Active Suicidal Ideation with Some Intent to Act, Without Specific Plan (Lifetime) No   4. Active Suicidal Ideation with Some Intent to Act, Without Specific Plan (Recent) No   5. Active Suicidal Ideation with Specific Plan and Intent (Lifetime) No   5. Active Suicidal Ideation with Specific Plan and Intent (Recent) No   Most Severe Ideation Rating (Lifetime) NA   Frequency (Lifetime) NA   Duration (Lifetime) NA   Controllability (Lifetime) NA   Protective Factors  (Lifetime) NA   Reasons for Ideation  (Lifetime) NA   Most Severe Ideation Rating (Past Month) NA   Frequency (Past Month) NA   Duration (Past Month) NA   Controllability (Past Month) NA   Protective Factors (Past Month) NA   Reasons for Ideation (Past Month) NA   Actual Attempt (Lifetime) No   Actual Attempt (Past 3 Months) No   Has subject engaged in non-suicidal self-injurious behavior? (Lifetime) No   Has subject engaged in non-suicidal self-injurious behavior? (Past 3 Months) No   Interrupted Attempts (Lifetime) No   Interrupted Attempts (Past 3 Months) No   Aborted or Self-Interrupted Attempt (Lifetime) No   Aborted or Self-Interrupted Attempt (Past 3 Months) No   Preparatory Acts or Behavior (Lifetime) No   Preparatory Acts or Behavior (Past 3 Months) No   Most Recent Attempt Actual Lethality Code NA   Most Lethal Attempt Actual Lethality Code NA   Initial/First Attempt Actual Lethality Code NA       Appearance:   Unable to assess   Eye Contact:   Unable to assess   Psychomotor Behavior: Unable to assess   Attitude:   Cooperative   Orientation:   All  Speech   Rate / Production: Normal    Volume:  Normal   Mood:    Irritable   Affect:    Appropriate   Thought Content:  Clear   Thought Form:  Coherent  Logical   Insight:    Fair  and External locus    Diagnoses:  1. 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  He was also given information about mental health symptoms and treatment options .  CD Recommendations: Smoking cessation should be encouraged.     Edgar Perdomo Psy.D, LP   Behavioral Health Clinician   [facility]     February 11, 2022

## 2022-02-16 ENCOUNTER — TELEPHONE (OUTPATIENT)
Dept: GASTROENTEROLOGY | Facility: CLINIC | Age: 57
End: 2022-02-16
Payer: COMMERCIAL

## 2022-02-16 NOTE — TELEPHONE ENCOUNTER
Called to remind patient of their upcoming appointment with our GI clinic, on 2/22/2022 at 10:20 AM with Dr. Boss. This appointment is scheduled as a telephone visit. You will receive a call approximately 30 minutes prior to check you in, you must be in [place] for this visit., if your appointment is virtual (video or telephone) you need to be in [place] for the visit. To reschedule or cancel patient to call [phone].    Danielle Dozier CMA

## 2022-02-17 NOTE — PROGRESS NOTES
Service Date: 02/10/2022    DO Brenden Dodge [facility]  [address] [place] [address], [address]  [place]  [address]    RE:  Jesica Jaimes  MRN:  [id number]  :  1965    Dear Dr. 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  Mr. Jaimes reports that his sensory symptoms continue to improve.  As I prepared to examine him, he shared some frustration with his living circumstances.  Further conversation made it clear that he has some passive suicidality and stressors including interpersonal issues, difficulty performing his current job responsibilities because of his medical condition, and fatigue.  He did indicate in clear terms that he has no active suicidal intent or plans and did agree to a behavioral health consultation, which was scheduled for the following day.  As Mr. Jaimes indicated that he had discontinued his medications, I contacted your office and I did obtain laboratory studies.  I discussed results with one of your partners who agreed to reinitiate his medications, reach out to the patient, and establish social service assistance through your office as well.    The full visit was focused on these issues and I did not complete a neurologic examination.    Mr. Jaimes has also been seen in cardiology consultation and has a scheduled GI appointment at the [facility].  I would be happy to see him on an as-needed basis going forward should his neuromuscular condition progress.    Sincerely,    Rocky Chavez MD        D: 2022   T: 2022   MT: sheri    Name:     JESICA JAIMES  MRN:      [id number]        Account:      [id number]   :      1965           Service Date: 02/10/2022       Document: [id number]

## 2022-02-18 ENCOUNTER — TELEPHONE (OUTPATIENT)
Dept: GASTROENTEROLOGY | Facility: CLINIC | Age: 57
End: 2022-02-18
Payer: COMMERCIAL

## 2022-02-18 NOTE — TELEPHONE ENCOUNTER
Called to remind patient of their upcoming appointment with our GI clinic, onTuesday 2/22/2022 at 10:20 AM with Dr. Boss. This appointment is scheduled as a telephone visit. You will receive a call approximately 30 minutes prior to check you in, you must be in [place] for this visit., if your appointment is virtual (video or telephone) you need to be in [place] for the visit. To reschedule or cancel patient to call [phone].    Xiomara Owens MA

## 2022-02-22 ENCOUNTER — PRE VISIT (OUTPATIENT)
Dept: GASTROENTEROLOGY | Facility: CLINIC | Age: 57
End: 2022-02-22

## 2022-02-22 ENCOUNTER — VIRTUAL VISIT (OUTPATIENT)
Dept: GASTROENTEROLOGY | Facility: CLINIC | Age: 57
End: 2022-02-22
Attending: PSYCHIATRY & NEUROLOGY
Payer: COMMERCIAL

## 2022-02-22 DIAGNOSIS — K22.70 BARRETT'S ESOPHAGUS WITHOUT DYSPLASIA: Primary | ICD-10-CM

## 2022-02-22 DIAGNOSIS — K21.9 GASTROESOPHAGEAL REFLUX DISEASE, UNSPECIFIED WHETHER ESOPHAGITIS PRESENT: ICD-10-CM

## 2022-02-22 DIAGNOSIS — R13.19 ESOPHAGEAL DYSPHAGIA: ICD-10-CM

## 2022-02-22 PROCEDURE — 99203 OFFICE O/P NEW LOW 30 MIN: CPT | Mod: 95 | Performed by: INTERNAL MEDICINE

## 2022-02-22 NOTE — PATIENT INSTRUCTIONS
It was a pleasure taking care of you today.  I've included a brief summary of our discussion and care plan from today's visit below.  Please review this information with your primary care provider.  _______________________________________________________________________    My recommendations are summarized as follows:    1. 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 Please confirm with the [place] [facility] ([facility]) team whether this is non-dysplastic, low grade, or high grade dysplasia. If there is no dysplasia then you may follow this with repeat endoscopy every 3 years. If there is low grade or high grade dysplasia, you will need the tissue burnt.   2. Please continue taking the omeprazole 40mg daily as prescribed by [facility]. This is to help prevent progression of the Luke's esophagus.  3. Future follow up should be with your primary gastroenterologist at [facility].    To schedule endoscopic procedures you may call: [phone]  To schedule radiology tests you may call: [phone]  To schedule an ENT appointment you may call: [phone]    Please call my nurse Angeles ([phone]), Skylar ([phone]) with any questions or concerns.  If you were seen through the [facility] please feel free to reach out to Anita at [phone]   --       _______________________________________________________________________    Who do I call with any questions after my visit?  Please be in touch if there are any further questions that arise following today's visit.  There are multiple ways to contact your gastroenterology care team.        During business hours, you may reach a Gastroenterology nurse at [phone] and choose option 3.         To schedule or reschedule an appointment, please call [phone].       You can always send a secure message through [employer].  [employer] messages are answered by your nurse or doctor typically within 24 hours.  Please allow extra time on weekends and holidays.        For  urgent/emergent questions after business hours, you may reach the on-call GI Fellow by contacting the [facility]  at [phone].     How will I get the results of any tests ordered?    You will receive all of your results.  If you have signed up for [facility], any tests ordered at your visit will be available to you after your physician reviews them.  Typically this takes 1-2 weeks.  If there are urgent results that require a change in your care plan, your physician or nurse will call you to discuss the next steps.      What is myThings?  myThings is a secure way for you to access all of your healthcare records from the [facility].  It is a web based computer program, so you can sign on to it from any location.  It also allows you to send secure messages to your care team.  I recommend signing up for myThings access if you have not already done so and are comfortable with using a computer.      How to I schedule a follow-up visit?  If you did not schedule a follow-up visit today, please call [phone] to schedule a follow-up office visit.      If you feel you received exceptional care and are interested in supporting the clinical and research goals of Dr. Boss or the Division of Gastroenterology, Hepatology, and Nutrition please contact [email or web address] from the [facility] to discuss opportunities to donate.    Sincerely,    Chencho Boss DO   of Medicine  Director, Esophageal Disorders Program  Division of Gastroenterology, Hepatology, and Nutrition  [facility]

## 2022-02-22 NOTE — LETTER
"    2/22/2022         RE: Sharath Jaimes  [address]  [facility] [address]        Dear Colleague,    Thank you for referring your patient, Sharath Jaimes, to the [facility] [place]. Please see a copy of my visit note below.    Gastroenterology Visit for: Sharath Jaimes 1965   MRN: [id number]     Reason for Visit:  chief complaint    Referred by: Kathy  / Erica [id number] / [facility] [id number]  Patient Care Team:  Perez Price as PCP - General  Antonino Monique MD as MD (Neurology)  Rocky Chavez MD as MD (Neurology)  Chencho Boss DO as MD (Gastroenterology)  Rocky Chavez MD as Assigned Neuroscience Provider  Enid Barclay MD as MD (Cardiovascular Disease)  Krysten Delgado RN as Specialty Care Coordinator  Enid Barclay MD as Assigned Heart and Vascular Provider    History of Present Illness:   Sharath Jaimes is a 56 year old male with giles's esophagus, DM2, GERD, peripheral neuropathy who is presenting as a new patient in consultation at the request of Dr. Chavez with a chief complaint of GERD.  ---------------------------------------------------------------  Sharath Jaimes states a little over a year ago he brought up was that he was having acid reflux. This had been ongoing since teenage years. He reports having some \"restriction\" . He was placed on prescription prilosec and felt complete resolution of dysphagia described as \"restriction\" and heartburn. He tried stopping prilosec and the acid reflux returned. He underwent repeat endoscopy and possible dilation (I do not have access the the endoscopy report). Most recent endoscopy a couple months ago with [facility]. He no longer has any issues with reflux or dysphagia. He also denies any symptoms of gastroparesis. Patient able to eat a full plate of food without nausea, vomiting, abdominal pain. He is able to eat high fat meals such as pork chop.   --------------------------------------------------------------- "     Sharath Jaimes denies dysphagia, odynophagia, heartburn/reflux, nausea, vomiting, early satiety, abdominal pain, abdominal distension/bloating, diarrhea, constipation, hematochezia, or melena. No unintentional weight loss.     Wt Readings from Last 5 Encounters:   12/16/21 71.5 kg (157 lb 11.2 oz)        Esophageal Questionnaire(s)    BEDQ Questionnaire  No flowsheet data found.  No flowsheet data found.    Eckardt Questionnaire  No flowsheet data found.    Promis 10 Questionnaire  No flowsheet data found.        STUDIES & PROCEDURES:    EGD:   Date: unable to see reports from [facility]. No reports scanned into media tab. Not available on care everywhere.  Impression:  Pathology Report:    Colonoscopy:  Date:  Impression:  Pathology Report:     EndoFLIP directed at the UES or LES (8cm (EF-325) balloon length or 16cm (EF-322) balloon length):   Date:  8cm balloon  Balloon inflation Balloon pressure CSA (mm^2) DI (mm^2/mmHg) Dmin (mm) Compliance   20 (ladmark ID)        30        40        50           16cm balloon  Balloon inflation Balloon pressure CSA (mm^2) DI (mm^2/mmHg) Dmin (mm) Compliance   30 (ladmark ID)        40        50        60        70           High Resolution Manometry:  Date:  Impression:    PH/Impedance:  Date:  Impression:     Bravo:  48 or 96hr  Date:  Impression:    CT:  Date:  Impression:    Esophagram:  Date:  Impression:     Prior medical records were reviewed including, but not limited to, notes from referring providers, lab work, radiographic tests, and other diagnostic tests. Pertinent results were summarized above.     History   No past medical history on file.    No past surgical history on file.    Social History     Socioeconomic History     Marital status: Single     Spouse name: Not on file     Number of children: Not on file     Years of education: Not on file     Highest education level: Not on file   Occupational History     Not on file   Tobacco Use     Smoking status: Current  Every Day Smoker     Packs/day: 1.50     Years: 20.00     Pack years: 30.00     Types: Cigarettes     Smokeless tobacco: Never Used   Substance and Sexual Activity     Alcohol use: Yes     Drug use: Never     Sexual activity: Not on file   Other Topics Concern     Not on file   Social History Narrative     Not on file     Social Determinants of Health     Financial Resource Strain: Not on file   Food Insecurity: Not on file   Transportation Needs: Not on file   Physical Activity: Not on file   Stress: Not on file   Social Connections: Not on file   Intimate Partner Violence: Not on file   Housing Stability: Not on file       No family history on file.  Family history reviewed and edited as appropriate    Medications and Allergies:     Outpatient Encounter Medications as of 2/22/2022   Medication Sig Dispense Refill     atorvastatin (LIPITOR) 40 MG tablet Take 1 tablet by mouth At Bedtime        BD PEN NEEDLE VISH 2ND GEN 32G X 4 MM miscellaneous USE ONCE DAILY       Blood Glucose Monitoring Suppl (ACCU-CHEK GUIDE) w/Device KIT USE TO TEST AS DIRECTED.       Continuous Blood Gluc  (FREEST[employer] EDGARDO 14 DAY READER) MANN See Admin Instructions        CONTOUR NEXT TEST test strip USE TO TEST FOUR TIMES DAILY.       insulin aspart (NOVOLOG PEN) 100 UNIT/ML pen Inject 2 Units Subcutaneous 3 times daily        insulin glargine (LANTUS SOLOSTAR) 100 UNIT/ML pen Inject 16 Units Subcutaneous At Bedtime        metFORMIN (GLUCOPHAGE) 1000 MG tablet Take 1 tablet by mouth 2 times daily (with meals)        Microlet Lancets MISC USE TO TEST FOUR TIMES DAILY AS DIRECTED       omeprazole (PRILOSEC) 40 MG DR capsule Take 40 mg by mouth daily        pregabalin (LYRICA) 100 MG capsule Take 1 capsule (100 mg) by mouth 2 times daily 60 capsule 3     traZODone (DESYREL) 50 MG tablet Take  mg by mouth daily        dronabinol (MARINOL) 5 MG capsule Take 5 mg by mouth 2 times daily (Patient not taking: Reported on 11/3/2021)        No facility-administered encounter medications on file as of 2/22/2022.        No Known Allergies     Review of systems:  A full 10 point review of systems was obtained and was negative except for the pertinent positives and negatives stated within the HPI.    Objective Findings:   Physical Exam:    Constitutional: There were no vitals taken for this visit.  Telephone consultation.     Labs, Radiology, Pathology     Lab Results   Component Value Date    WBC 5.7 02/11/2022    HGB 13.3 02/11/2022     02/11/2022    ALT 26 02/11/2022    AST 15 02/11/2022     02/11/2022    BUN 17 02/11/2022    CO2 27 02/11/2022        Liver Function Studies -   Recent Labs   Lab Test 02/11/22  1107   PROTTOTAL 6.7*   ALBUMIN 3.7   BILITOTAL 0.5   ALKPHOS 74   AST 15   ALT 26          Patient Active Problem List    Diagnosis Date Noted     Esophageal dysphagia 02/22/2022     Priority: Medium     Gastroesophageal reflux disease, unspecified whether esophagitis present 02/22/2022     Priority: Medium     Lung nodule 05/11/2021     Priority: Medium     Formatting of this note might be different from the original.  5/4 CT scan: 3 mm pulmonary nodule in the left lower lobe. If the patient has a history of smoking a follow-up chest CT scan should be considered in 1 year to document stability of this nodule. Otherwise, no follow-up is required.       Luke's esophagus without dysplasia 01/12/2021     Priority: Medium     Last Assessment & Plan:   Formatting of this note is different from the original.  Endoscopy 3/15/21:   - Final Plan:Return for an upper endoscopy (EGD) in 3 years for Luke's. (Due 3/2024)          Hyperlipidemia 01/11/2021     Priority: Medium     Type 2 diabetes mellitus (H) 01/09/2021     Priority: Medium     Formatting of this note might be different from the original.  New dx Jan '21. Presented with weight loss, polyuria, polydipsia, A1c >17 1/8/21  Started on lantus/metformin    Needs eye exam        Peripheral sensory neuropathy 01/08/2021     Priority: Medium     Formatting of this note might be different from the original.  Likely 2/2 diabetes  Gabapentin        Assessment and Plan   Assessment:    Sharath Jaimes is a 56 year old male with giles's esophagus, DM2, GERD, peripheral neuropathy who is presenting as a new patient in consultation at the request of Dr. Chavez with a chief complaint of GERD.    The patient was seen in telephone telemedicine consultation regarding his symptoms of heartburn and dysphagia.  Currently the patient denies any symptoms of heartburn and dysphagia.  I explored the possibility of Giles's esophagus with him and he does admit to having a history of Giles's esophagus.  This was confirmed by review of internal medicine records through care everywhere.  The patient is uncertain if he had any findings of dysplasia.  Based on his history and report that he will have a repeat endoscopy in several years I can assume that he has nondysplastic Giles's esophagus.  I asked that he confirm this with his primary gastroenterologist at [facility].  If he has any findings of dysplasia he should undergo RFA or cryoablation.    I also educated the patient that he will require longstanding use of his omeprazole based on his Giles's esophagus.  He should continue taking omeprazole 40 mg indefinitely.  This will be managed by his primary gastroenterologist at [facility].    Currently the patient exhibits no symptoms of gastroparesis and at this time I do not wish to pursue a gastric emptying study.  His initial symptoms of abdominal discomfort resolved with PPI use.    Should the patient decide that he wishes to change his gastroenterology team we are happy to have him seen on a continued basis however he has a current established care plan with a different group.    1. Giles's esophagus without dysplasia    2. Esophageal dysphagia    3. Gastroesophageal  reflux disease, unspecified whether esophagitis present       No orders of the defined types were placed in this encounter.     Plan:  1. 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 Please confirm with the [facility] ([facility]) team whether this is non-dysplastic, low grade, or high grade dysplasia. If there is no dysplasia then you may follow this with repeat endoscopy every 3 years. If there is low grade or high grade dysplasia, you will need the tissue burnt.   2. Please continue taking the omeprazole 40mg daily as prescribed by [facility]. This is to help prevent progression of the Luke's esophagus.  3. Future follow up should be with your primary gastroenterologist at [facility].    Follow up plan:   Return to clinic as needed.    The risks and benefits of my recommendations, as well as other treatment options were discussed with the patient and any available family today. All questions were answered.     o Follow up: As planned above. Today, I personally spent 15 minutes in direct telephone time with the patient, of which greater than 50% of the time was spent in patient education and counseling as described above. Approximately 16 minutes were spent on indirect care associated with the patient's consultation including but not limited to review of: patient medical records to date, clinic visits, hospital records, lab results, imaging studies, procedural documentation, and coordinating care with other providers. The findings from this review are summarized in the above note. All of the above accounted for a cumulative time of 31 minutes and was performed on the date of service.     The patient verbalized understanding of the plan and was appreciative for the time spent and information provided during the office visit.       Author:   Chencho Boss DO   of Medicine  Director, Esophageal Disorders Program  Division of Gastroenterology, Hepatology, and Nutrition  [facility]  [place]       Documentation assisted by voice recognition and documentation system.

## 2022-02-22 NOTE — PROGRESS NOTES
"Sharath is a 56 year old who is being evaluated via a billable telephone visit.      What phone number would you like to be contacted at? [phone]  How would you like to obtain your AVS? Mail a copy  Phone call duration: 15 minutes    Gastroenterology Visit for: Sharath Jaimes 1965   MRN: [id number]     Reason for Visit:  chief complaint    Referred by: Kathy  / Erica [facility] [id number] / [facility] [id number]  Patient Care Team:  Perez Price as PCP - General  Antonino Monique MD as MD (Neurology)  Rocky Chavez MD as MD (Neurology)  Chencho Boss DO as MD (Gastroenterology)  Rocky Chavez MD as Assigned Neuroscience Provider  Enid Barclay MD as MD (Cardiovascular Disease)  Krysten Delgado RN as Specialty Care Coordinator  Enid Barclay MD as Assigned Heart and Vascular Provider    History of Present Illness:   Sharath Jaimes is a 56 year old male with giles's esophagus, DM2, GERD, peripheral neuropathy who is presenting as a new patient in consultation at the request of Dr. Chavez with a chief complaint of GERD.  ---------------------------------------------------------------  Sharath Jaimes states a little over a year ago he brought up was that he was having acid reflux. This had been ongoing since teenage years. He reports having some \"restriction\" . He was placed on prescription prilosec and felt complete resolution of dysphagia described as \"restriction\" and heartburn. He tried stopping prilosec and the acid reflux returned. He underwent repeat endoscopy and possible dilation (I do not have access the the endoscopy report). Most recent endoscopy a couple months ago with [facility]. He no longer has any issues with reflux or dysphagia. He also denies any symptoms of gastroparesis. Patient able to eat a full plate of food without nausea, vomiting, abdominal pain. He is able to eat high fat meals such as pork chop.   ---------------------------------------------------------------     Sharath bojorquez, " odynophagia, heartburn/reflux, nausea, vomiting, early satiety, abdominal pain, abdominal distension/bloating, diarrhea, constipation, hematochezia, or melena. No unintentional weight loss.     Wt Readings from Last 5 Encounters:   12/16/21 71.5 kg (157 lb 11.2 oz)        Esophageal Questionnaire(s)    BEDQ Questionnaire  No flowsheet data found.  No flowsheet data found.    Eckardt Questionnaire  No flowsheet data found.    Promis 10 Questionnaire  No flowsheet data found.        STUDIES & PROCEDURES:    EGD:   Date: unable to see reports from [facility]. No reports scanned into media tab. Not available on care everywhere.  Impression:  Pathology Report:    Colonoscopy:  Date:  Impression:  Pathology Report:     EndoFLIP directed at the UES or LES (8cm (EF-325) balloon length or 16cm (EF-322) balloon length):   Date:  8cm balloon  Balloon inflation Balloon pressure CSA (mm^2) DI (mm^2/mmHg) Dmin (mm) Compliance   20 (ladmark ID)        30        40        50           16cm balloon  Balloon inflation Balloon pressure CSA (mm^2) DI (mm^2/mmHg) Dmin (mm) Compliance   30 (ladmark ID)        40        50        60        70           High Resolution Manometry:  Date:  Impression:    PH/Impedance:  Date:  Impression:     Bravo:  48 or 96hr  Date:  Impression:    CT:  Date:  Impression:    Esophagram:  Date:  Impression:     Prior medical records were reviewed including, but not limited to, notes from referring providers, lab work, radiographic tests, and other diagnostic tests. Pertinent results were summarized above.     History   No past medical history on file.    No past surgical history on file.    Social History     Socioeconomic History     Marital status: Single     Spouse name: Not on file     Number of children: Not on file     Years of education: Not on file     Highest education level: Not on file   Occupational History     Not on file   Tobacco Use     Smoking status: Current Every Day Smoker     Packs/day: 1.50      Years: 20.00     Pack years: 30.00     Types: Cigarettes     Smokeless tobacco: Never Used   Substance and Sexual Activity     Alcohol use: Yes     Drug use: Never     Sexual activity: Not on file   Other Topics Concern     Not on file   Social History Narrative     Not on file     Social Determinants of Health     Financial Resource Strain: Not on file   Food Insecurity: Not on file   Transportation Needs: Not on file   Physical Activity: Not on file   Stress: Not on file   Social Connections: Not on file   Intimate Partner Violence: Not on file   Housing Stability: Not on file       No family history on file.  Family history reviewed and edited as appropriate    Medications and Allergies:     Outpatient Encounter Medications as of 2/22/2022   Medication Sig Dispense Refill     atorvastatin (LIPITOR) 40 MG tablet Take 1 tablet by mouth At Bedtime        BD PEN NEEDLE VISH 2ND GEN 32G X 4 MM miscellaneous USE ONCE DAILY       Blood Glucose Monitoring Suppl (ACCU-CHEK GUIDE) w/Device KIT USE TO TEST AS DIRECTED.       Continuous Blood Gluc  (FREESTYLE EDGARDO 14 DAY READER) MANN See Admin Instructions        CONTOUR NEXT TEST test strip USE TO TEST FOUR TIMES DAILY.       insulin aspart (NOVOLOG PEN) 100 UNIT/ML pen Inject 2 Units Subcutaneous 3 times daily        insulin glargine (LANTUS SOLOSTAR) 100 UNIT/ML pen Inject 16 Units Subcutaneous At Bedtime        metFORMIN (GLUCOPHAGE) 1000 MG tablet Take 1 tablet by mouth 2 times daily (with meals)        Microlet Lancets MISC USE TO TEST FOUR TIMES DAILY AS DIRECTED       omeprazole (PRILOSEC) 40 MG DR capsule Take 40 mg by mouth daily        pregabalin (LYRICA) 100 MG capsule Take 1 capsule (100 mg) by mouth 2 times daily 60 capsule 3     traZODone (DESYREL) 50 MG tablet Take  mg by mouth daily        dronabinol (MARINOL) 5 MG capsule Take 5 mg by mouth 2 times daily (Patient not taking: Reported on 11/3/2021)       No facility-administered encounter  medications on file as of 2/22/2022.        No Known Allergies     Review of systems:  A full 10 point review of systems was obtained and was negative except for the pertinent positives and negatives stated within the HPI.    Objective Findings:   Physical Exam:    Constitutional: There were no vitals taken for this visit.  Telephone consultation.     Labs, Radiology, Pathology     Lab Results   Component Value Date    WBC 5.7 02/11/2022    HGB 13.3 02/11/2022     02/11/2022    ALT 26 02/11/2022    AST 15 02/11/2022     02/11/2022    BUN 17 02/11/2022    CO2 27 02/11/2022        Liver Function Studies -   Recent Labs   Lab Test 02/11/22  1107   PROTTOTAL 6.7*   ALBUMIN 3.7   BILITOTAL 0.5   ALKPHOS 74   AST 15   ALT 26          Patient Active Problem List    Diagnosis Date Noted     Esophageal dysphagia 02/22/2022     Priority: Medium     Gastroesophageal reflux disease, unspecified whether esophagitis present 02/22/2022     Priority: Medium     Lung nodule 05/11/2021     Priority: Medium     Formatting of this note might be different from the original.  5/4 CT scan: 3 mm pulmonary nodule in the left lower lobe. If the patient has a history of smoking a follow-up chest CT scan should be considered in 1 year to document stability of this nodule. Otherwise, no follow-up is required.       Luke's esophagus without dysplasia 01/12/2021     Priority: Medium     Last Assessment & Plan:   Formatting of this note is different from the original.  Endoscopy 3/15/21:   - Final Plan:Return for an upper endoscopy (EGD) in 3 years for Luke's. (Due 3/2024)          Hyperlipidemia 01/11/2021     Priority: Medium     Type 2 diabetes mellitus (H) 01/09/2021     Priority: Medium     Formatting of this note might be different from the original.  New dx Jan '21. Presented with weight loss, polyuria, polydipsia, A1c >17 1/8/21  Started on lantus/metformin    Needs eye exam       Peripheral sensory neuropathy 01/08/2021      Priority: Medium     Formatting of this note might be different from the original.  Likely 2/2 diabetes  Gabapentin        Assessment and Plan   Assessment:    Sharath Jaimes is a 56 year old male with giles's esophagus, DM2, GERD, peripheral neuropathy who is presenting as a new patient in consultation at the request of Dr. Chavez with a chief complaint of GERD.    The patient was seen in telephone telemedicine consultation regarding his symptoms of heartburn and dysphagia.  Currently the patient denies any symptoms of heartburn and dysphagia.  I explored the possibility of Giles's esophagus with him and he does admit to having a history of Giles's esophagus.  This was confirmed by review of internal medicine records through care everywhere.  The patient is uncertain if he had any findings of dysplasia.  Based on his history and report that he will have a repeat endoscopy in several years I can assume that he has nondysplastic Giles's esophagus.  I asked that he confirm this with his primary gastroenterologist at [facility].  If he has any findings of dysplasia he should undergo RFA or cryoablation.    I also educated the patient that he will require longstanding use of his omeprazole based on his Giles's esophagus.  He should continue taking omeprazole 40 mg indefinitely.  This will be managed by his primary gastroenterologist at [facility].    Currently the patient exhibits no symptoms of gastroparesis and at this time I do not wish to pursue a gastric emptying study.  His initial symptoms of abdominal discomfort resolved with PPI use.    Should the patient decide that he wishes to change his gastroenterology team we are happy to have him seen on a continued basis however he has a current established care plan with a different group.    1. Giles's esophagus without dysplasia    2. Esophageal dysphagia    3. Gastroesophageal reflux disease, unspecified whether  esophagitis present       No orders of the defined types were placed in this encounter.     Plan:  1. 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 Please confirm with the [facility] ([facility]) team whether this is non-dysplastic, low grade, or high grade dysplasia. If there is no dysplasia then you may follow this with repeat endoscopy every 3 years. If there is low grade or high grade dysplasia, you will need the tissue burnt.   2. Please continue taking the omeprazole 40mg daily as prescribed by [facility]. This is to help prevent progression of the Luke's esophagus.  3. Future follow up should be with your primary gastroenterologist at [facility].    Follow up plan:   Return to clinic as needed.    The risks and benefits of my recommendations, as well as other treatment options were discussed with the patient and any available family today. All questions were answered.     o Follow up: As planned above. Today, I personally spent 15 minutes in direct telephone time with the patient, of which greater than 50% of the time was spent in patient education and counseling as described above. Approximately 16 minutes were spent on indirect care associated with the patient's consultation including but not limited to review of: patient medical records to date, clinic visits, hospital records, lab results, imaging studies, procedural documentation, and coordinating care with other providers. The findings from this review are summarized in the above note. All of the above accounted for a cumulative time of 31 minutes and was performed on the date of service.     The patient verbalized understanding of the plan and was appreciative for the time spent and information provided during the office visit.     Author:   Chencho Boss DO   of Medicine  Director, Esophageal Disorders Program  Division of Gastroenterology, Hepatology, and Nutrition  [facility]     Documentation assisted by voice  recognition and documentation system.